# Patient Record
Sex: FEMALE | Race: WHITE | Employment: FULL TIME | URBAN - METROPOLITAN AREA
[De-identification: names, ages, dates, MRNs, and addresses within clinical notes are randomized per-mention and may not be internally consistent; named-entity substitution may affect disease eponyms.]

---

## 2019-02-09 ENCOUNTER — HOSPITAL ENCOUNTER (EMERGENCY)
Age: 34
Discharge: HOME | End: 2019-02-09
Payer: COMMERCIAL

## 2019-02-09 VITALS
HEIGHT: 68 IN | RESPIRATION RATE: 16 BRPM | TEMPERATURE: 98.4 F | OXYGEN SATURATION: 100 % | BODY MASS INDEX: 28.04 KG/M2 | WEIGHT: 185 LBS | SYSTOLIC BLOOD PRESSURE: 127 MMHG | DIASTOLIC BLOOD PRESSURE: 61 MMHG | HEART RATE: 69 BPM
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
## 2019-02-09 DIAGNOSIS — S46.812A STRAIN OF LEFT TRAPEZIUS MUSCLE, INITIAL ENCOUNTER: ICD-10-CM

## 2019-02-09 PROCEDURE — 99282 EMERGENCY DEPT VISIT SF MDM: CPT

## 2019-02-09 RX ORDER — METHOCARBAMOL 500 MG/1
500 TABLET, FILM COATED ORAL 3 TIMES DAILY
Qty: 30 TABLET | Refills: 0 | Status: SHIPPED | OUTPATIENT
Start: 2019-02-09 | End: 2019-04-04 | Stop reason: ALTCHOICE

## 2019-02-09 ASSESSMENT — PAIN DESCRIPTION - ORIENTATION: ORIENTATION: LEFT

## 2019-02-09 ASSESSMENT — PAIN SCALES - WONG BAKER: WONGBAKER_NUMERICALRESPONSE: 2

## 2019-02-09 ASSESSMENT — PAIN DESCRIPTION - LOCATION: LOCATION: NECK;SHOULDER

## 2019-02-09 ASSESSMENT — PAIN DESCRIPTION - DESCRIPTORS: DESCRIPTORS: ACHING

## 2019-02-09 ASSESSMENT — PAIN SCALES - GENERAL: PAINLEVEL_OUTOF10: 4

## 2019-04-04 ENCOUNTER — HOSPITAL ENCOUNTER (EMERGENCY)
Age: 34
Discharge: HOME | End: 2019-04-04
Attending: EMERGENCY MEDICINE
Payer: COMMERCIAL

## 2019-04-04 ENCOUNTER — APPOINTMENT (OUTPATIENT)
Dept: CT IMAGING | Age: 34
End: 2019-04-04
Payer: COMMERCIAL

## 2019-04-04 VITALS
HEIGHT: 67 IN | TEMPERATURE: 98.3 F | RESPIRATION RATE: 18 BRPM | HEART RATE: 82 BPM | BODY MASS INDEX: 29.03 KG/M2 | OXYGEN SATURATION: 97 % | WEIGHT: 185 LBS | SYSTOLIC BLOOD PRESSURE: 119 MMHG | DIASTOLIC BLOOD PRESSURE: 67 MMHG

## 2019-04-04 DIAGNOSIS — N73.0 PID (ACUTE PELVIC INFLAMMATORY DISEASE): Primary | ICD-10-CM

## 2019-04-04 LAB
ALBUMIN SERPL-MCNC: 4.4 G/DL (ref 3.5–5.2)
ALP BLD-CCNC: 56 U/L (ref 35–104)
ALT SERPL-CCNC: 10 U/L (ref 0–32)
ANION GAP SERPL CALCULATED.3IONS-SCNC: 7 MMOL/L (ref 7–16)
AST SERPL-CCNC: 11 U/L (ref 0–31)
BASOPHILS ABSOLUTE: 0.07 E9/L (ref 0–0.2)
BASOPHILS RELATIVE PERCENT: 0.7 % (ref 0–2)
BILIRUB SERPL-MCNC: 0.3 MG/DL (ref 0–1.2)
BILIRUBIN URINE: NEGATIVE
BLOOD, URINE: NEGATIVE
BUN BLDV-MCNC: 8 MG/DL (ref 6–20)
CALCIUM SERPL-MCNC: 9.3 MG/DL (ref 8.6–10.2)
CHLORIDE BLD-SCNC: 102 MMOL/L (ref 98–107)
CLARITY: CLEAR
CO2: 30 MMOL/L (ref 22–29)
COLOR: NORMAL
CREAT SERPL-MCNC: 0.7 MG/DL (ref 0.5–1)
EOSINOPHILS ABSOLUTE: 0.09 E9/L (ref 0.05–0.5)
EOSINOPHILS RELATIVE PERCENT: 0.9 % (ref 0–6)
GFR AFRICAN AMERICAN: >60
GFR NON-AFRICAN AMERICAN: >60 ML/MIN/1.73
GLUCOSE BLD-MCNC: 86 MG/DL (ref 74–99)
GLUCOSE URINE: NEGATIVE MG/DL
HCG, URINE, POC: NEGATIVE
HCT VFR BLD CALC: 35.4 % (ref 34–48)
HEMOGLOBIN: 10.9 G/DL (ref 11.5–15.5)
IMMATURE GRANULOCYTES #: 0.03 E9/L
IMMATURE GRANULOCYTES %: 0.3 % (ref 0–5)
KETONES, URINE: NEGATIVE MG/DL
LACTIC ACID: 0.5 MMOL/L (ref 0.5–2.2)
LEUKOCYTE ESTERASE, URINE: NEGATIVE
LIPASE: 24 U/L (ref 13–60)
LYMPHOCYTES ABSOLUTE: 3.1 E9/L (ref 1.5–4)
LYMPHOCYTES RELATIVE PERCENT: 30.6 % (ref 20–42)
Lab: NORMAL
MCH RBC QN AUTO: 27.1 PG (ref 26–35)
MCHC RBC AUTO-ENTMCNC: 30.8 % (ref 32–34.5)
MCV RBC AUTO: 88.1 FL (ref 80–99.9)
MONOCYTES ABSOLUTE: 0.74 E9/L (ref 0.1–0.95)
MONOCYTES RELATIVE PERCENT: 7.3 % (ref 2–12)
NEGATIVE QC PASS/FAIL: NORMAL
NEUTROPHILS ABSOLUTE: 6.1 E9/L (ref 1.8–7.3)
NEUTROPHILS RELATIVE PERCENT: 60.2 % (ref 43–80)
NITRITE, URINE: NEGATIVE
PDW BLD-RTO: 13.5 FL (ref 11.5–15)
PH UA: 7 (ref 5–9)
PLATELET # BLD: 295 E9/L (ref 130–450)
PMV BLD AUTO: 11 FL (ref 7–12)
POSITIVE QC PASS/FAIL: NORMAL
POTASSIUM SERPL-SCNC: 3.8 MMOL/L (ref 3.5–5)
PROTEIN UA: NEGATIVE MG/DL
RBC # BLD: 4.02 E12/L (ref 3.5–5.5)
SODIUM BLD-SCNC: 139 MMOL/L (ref 132–146)
SPECIFIC GRAVITY UA: <=1.005 (ref 1–1.03)
TOTAL PROTEIN: 6.6 G/DL (ref 6.4–8.3)
UROBILINOGEN, URINE: 0.2 E.U./DL
WBC # BLD: 10.1 E9/L (ref 4.5–11.5)

## 2019-04-04 PROCEDURE — 6360000004 HC RX CONTRAST MEDICATION: Performed by: RADIOLOGY

## 2019-04-04 PROCEDURE — 2580000003 HC RX 258: Performed by: EMERGENCY MEDICINE

## 2019-04-04 PROCEDURE — 87591 N.GONORRHOEAE DNA AMP PROB: CPT

## 2019-04-04 PROCEDURE — 87491 CHLMYD TRACH DNA AMP PROBE: CPT

## 2019-04-04 PROCEDURE — 6360000002 HC RX W HCPCS: Performed by: EMERGENCY MEDICINE

## 2019-04-04 PROCEDURE — 2500000003 HC RX 250 WO HCPCS: Performed by: EMERGENCY MEDICINE

## 2019-04-04 PROCEDURE — 83690 ASSAY OF LIPASE: CPT

## 2019-04-04 PROCEDURE — 96365 THER/PROPH/DIAG IV INF INIT: CPT

## 2019-04-04 PROCEDURE — 80053 COMPREHEN METABOLIC PANEL: CPT

## 2019-04-04 PROCEDURE — 96372 THER/PROPH/DIAG INJ SC/IM: CPT

## 2019-04-04 PROCEDURE — 83605 ASSAY OF LACTIC ACID: CPT

## 2019-04-04 PROCEDURE — 96375 TX/PRO/DX INJ NEW DRUG ADDON: CPT

## 2019-04-04 PROCEDURE — 96366 THER/PROPH/DIAG IV INF ADDON: CPT

## 2019-04-04 PROCEDURE — 99284 EMERGENCY DEPT VISIT MOD MDM: CPT

## 2019-04-04 PROCEDURE — 74177 CT ABD & PELVIS W/CONTRAST: CPT

## 2019-04-04 PROCEDURE — 81003 URINALYSIS AUTO W/O SCOPE: CPT

## 2019-04-04 PROCEDURE — 85025 COMPLETE CBC W/AUTO DIFF WBC: CPT

## 2019-04-04 PROCEDURE — 6370000000 HC RX 637 (ALT 250 FOR IP): Performed by: EMERGENCY MEDICINE

## 2019-04-04 RX ORDER — DOXYCYCLINE HYCLATE 100 MG/1
100 CAPSULE ORAL ONCE
Status: COMPLETED | OUTPATIENT
Start: 2019-04-04 | End: 2019-04-04

## 2019-04-04 RX ORDER — DOXYCYCLINE HYCLATE 100 MG
100 TABLET ORAL 2 TIMES DAILY
Qty: 28 TABLET | Refills: 0 | Status: SHIPPED | OUTPATIENT
Start: 2019-04-04 | End: 2019-04-18

## 2019-04-04 RX ORDER — 0.9 % SODIUM CHLORIDE 0.9 %
1000 INTRAVENOUS SOLUTION INTRAVENOUS ONCE
Status: COMPLETED | OUTPATIENT
Start: 2019-04-04 | End: 2019-04-04

## 2019-04-04 RX ORDER — HYDROCODONE BITARTRATE AND ACETAMINOPHEN 5; 325 MG/1; MG/1
1 TABLET ORAL EVERY 6 HOURS PRN
Qty: 10 TABLET | Refills: 0 | Status: SHIPPED | OUTPATIENT
Start: 2019-04-04 | End: 2019-04-07

## 2019-04-04 RX ORDER — ONDANSETRON 4 MG/1
4 TABLET, ORALLY DISINTEGRATING ORAL EVERY 8 HOURS PRN
Qty: 10 TABLET | Refills: 0 | Status: SHIPPED | OUTPATIENT
Start: 2019-04-04 | End: 2019-07-01

## 2019-04-04 RX ORDER — MORPHINE SULFATE 2 MG/ML
4 INJECTION, SOLUTION INTRAMUSCULAR; INTRAVENOUS ONCE
Status: COMPLETED | OUTPATIENT
Start: 2019-04-04 | End: 2019-04-04

## 2019-04-04 RX ORDER — ONDANSETRON 2 MG/ML
4 INJECTION INTRAMUSCULAR; INTRAVENOUS ONCE
Status: COMPLETED | OUTPATIENT
Start: 2019-04-04 | End: 2019-04-04

## 2019-04-04 RX ORDER — HYDROCODONE BITARTRATE AND ACETAMINOPHEN 5; 325 MG/1; MG/1
1 TABLET ORAL ONCE
Status: COMPLETED | OUTPATIENT
Start: 2019-04-04 | End: 2019-04-04

## 2019-04-04 RX ORDER — FENTANYL CITRATE 50 UG/ML
50 INJECTION, SOLUTION INTRAMUSCULAR; INTRAVENOUS ONCE
Status: COMPLETED | OUTPATIENT
Start: 2019-04-04 | End: 2019-04-04

## 2019-04-04 RX ADMIN — PIPERACILLIN SODIUM AND TAZOBACTAM SODIUM 3.38 G: 3; .375 INJECTION, POWDER, LYOPHILIZED, FOR SOLUTION INTRAVENOUS at 14:27

## 2019-04-04 RX ADMIN — FENTANYL CITRATE 50 MCG: 50 INJECTION INTRAMUSCULAR; INTRAVENOUS at 14:25

## 2019-04-04 RX ADMIN — DOXYCYCLINE HYCLATE 100 MG: 100 CAPSULE ORAL at 19:24

## 2019-04-04 RX ADMIN — SODIUM CHLORIDE 1000 ML: 9 INJECTION, SOLUTION INTRAVENOUS at 12:04

## 2019-04-04 RX ADMIN — HYDROCODONE BITARTRATE AND ACETAMINOPHEN 1 TABLET: 5; 325 TABLET ORAL at 19:24

## 2019-04-04 RX ADMIN — MORPHINE SULFATE 4 MG: 2 INJECTION, SOLUTION INTRAMUSCULAR; INTRAVENOUS at 12:04

## 2019-04-04 RX ADMIN — IOPAMIDOL 110 ML: 755 INJECTION, SOLUTION INTRAVENOUS at 13:11

## 2019-04-04 RX ADMIN — ONDANSETRON 4 MG: 2 INJECTION INTRAMUSCULAR; INTRAVENOUS at 12:05

## 2019-04-04 RX ADMIN — CEFTRIAXONE SODIUM 250 MG: 250 INJECTION, POWDER, FOR SOLUTION INTRAMUSCULAR; INTRAVENOUS at 19:23

## 2019-04-04 ASSESSMENT — PAIN SCALES - GENERAL
PAINLEVEL_OUTOF10: 7
PAINLEVEL_OUTOF10: 6
PAINLEVEL_OUTOF10: 6
PAINLEVEL_OUTOF10: 7

## 2019-04-04 ASSESSMENT — PAIN DESCRIPTION - DESCRIPTORS
DESCRIPTORS: SHOOTING;SHARP
DESCRIPTORS: ACHING;DULL

## 2019-04-04 ASSESSMENT — PAIN DESCRIPTION - FREQUENCY
FREQUENCY: INTERMITTENT
FREQUENCY: INTERMITTENT

## 2019-04-04 ASSESSMENT — PAIN SCALES - WONG BAKER: WONGBAKER_NUMERICALRESPONSE: 0

## 2019-04-04 ASSESSMENT — PAIN DESCRIPTION - LOCATION: LOCATION: ABDOMEN

## 2019-04-04 ASSESSMENT — PAIN DESCRIPTION - ORIENTATION: ORIENTATION: RIGHT

## 2019-04-04 ASSESSMENT — PAIN DESCRIPTION - PAIN TYPE
TYPE: ACUTE PAIN
TYPE: ACUTE PAIN

## 2019-04-04 NOTE — CONSULTS
The liver is unremarkable The spleen is unremarkable. The kidneys are unremarkable The adrenals is  unremarkable. The pancreas is unremarkable. Vermiform appendix is not well-defined and is silhouetted by fluid. Right ovary is also not well-defined and contains a tiny cystic structure where a 2 cm cystic present previously. Some fluid extends upwards to the subhepatic regional along the right flank and also downward along the paracolic gutter into the pelvis with a good portion subtly posteriorly. Fluid is slightly complex. The bladder is unremarkable. T-shaped intrauterine device again remains satisfactorily positioned. Uterus remains anteverted. Left ovary is unremarkable. Findings are nonspecific. Acute appendicitis with rupture must be considered. Recent rupture of a hemorrhagic ovarian cyst was additionally be considered. Pelvic inflammatory disease is another consideration. Clinical and laboratory correlation suggested. Results discussed with the ordering ER doctor today.          ASSESSMENT:  29 y.o. female with abdominal pain possibly secondary to ruptured ovarian cyst    PLAN:  No acute surgical intervention  Recommend admission to medicine with OB/Gyn consult  Chlamydia and Gonnorrhea test pending  NPO  IVF's  Pain and nausea control     D/w Dr. Swenson March    Electronically signed by Lora Blunt MD on 4/4/19 at 6:03 PM

## 2019-04-04 NOTE — ED PROVIDER NOTES
Alert and oriented x3  Head: Normocephalic and atraumatic  Eyes: PERRL, EOMI  Mouth: Oropharynx clear, handling secretions  Neck: Supple, full ROM  Respiratory: Lungs clear to auscultation bilaterally, no wheezes, rales, or rhonchi. Not in respiratory distress  Cardiovascular:  Regular rate. Regular rhythm. No murmurs, gallops, or rubs. 2+ distal pulses  Chest: No chest wall tenderness  GI:  Abdomen Soft, RLQ tenderness, Non distended. +BS. No rebound, guarding, or rigidity. No pulsatile masses. Musculoskeletal: Moves all extremities x 4. Warm and well perfused, no edema. Integument: skin warm and dry. No rashes. Neurologic: GCS 15, no focal deficits  Psychiatric: Normal Affect      -------------------------------------------------- RESULTS -------------------------------------------------  I have personally reviewed all laboratory and imaging results for this patient. Results are listed below. LABS:  Results for orders placed or performed during the hospital encounter of 04/04/19   C. Trachomatis / N. Gonorrhoeae, DNA Probe   Result Value Ref Range    C. Trachomatis Amplified       Negative for C. Trachomatis rRNA  Results should be interpreted in conjunction with other  laboratory and clinical data available to the clinician. N. Gonorrhoeae Amplified       Negative for N. gonorrhoeae rRNA  Results should be interpreted in conjunction with other  laboratory and clinical data available to the clinician.      CBC Auto Differential   Result Value Ref Range    WBC 10.1 4.5 - 11.5 E9/L    RBC 4.02 3.50 - 5.50 E12/L    Hemoglobin 10.9 (L) 11.5 - 15.5 g/dL    Hematocrit 35.4 34.0 - 48.0 %    MCV 88.1 80.0 - 99.9 fL    MCH 27.1 26.0 - 35.0 pg    MCHC 30.8 (L) 32.0 - 34.5 %    RDW 13.5 11.5 - 15.0 fL    Platelets 970 949 - 490 E9/L    MPV 11.0 7.0 - 12.0 fL    Neutrophils % 60.2 43.0 - 80.0 %    Immature Granulocytes % 0.3 0.0 - 5.0 %    Lymphocytes % 30.6 20.0 - 42.0 %    Monocytes % 7.3 2.0 - 12.0 % Eosinophils % 0.9 0.0 - 6.0 %    Basophils % 0.7 0.0 - 2.0 %    Neutrophils # 6.10 1.80 - 7.30 E9/L    Immature Granulocytes # 0.03 E9/L    Lymphocytes # 3.10 1.50 - 4.00 E9/L    Monocytes # 0.74 0.10 - 0.95 E9/L    Eosinophils # 0.09 0.05 - 0.50 E9/L    Basophils # 0.07 0.00 - 0.20 E9/L   Comprehensive Metabolic Panel   Result Value Ref Range    Sodium 139 132 - 146 mmol/L    Potassium 3.8 3.5 - 5.0 mmol/L    Chloride 102 98 - 107 mmol/L    CO2 30 (H) 22 - 29 mmol/L    Anion Gap 7 7 - 16 mmol/L    Glucose 86 74 - 99 mg/dL    BUN 8 6 - 20 mg/dL    CREATININE 0.7 0.5 - 1.0 mg/dL    GFR Non-African American >60 >=60 mL/min/1.73    GFR African American >60     Calcium 9.3 8.6 - 10.2 mg/dL    Total Protein 6.6 6.4 - 8.3 g/dL    Alb 4.4 3.5 - 5.2 g/dL    Total Bilirubin 0.3 0.0 - 1.2 mg/dL    Alkaline Phosphatase 56 35 - 104 U/L    ALT 10 0 - 32 U/L    AST 11 0 - 31 U/L   Lactic Acid, Plasma   Result Value Ref Range    Lactic Acid 0.5 0.5 - 2.2 mmol/L   Lipase   Result Value Ref Range    Lipase 24 13 - 60 U/L   Urinalysis   Result Value Ref Range    Color, UA Straw Straw/Yellow    Clarity, UA Clear Clear    Glucose, Ur Negative Negative mg/dL    Bilirubin Urine Negative Negative    Ketones, Urine Negative Negative mg/dL    Specific Gravity, UA <=1.005 1.005 - 1.030    Blood, Urine Negative Negative    pH, UA 7.0 5.0 - 9.0    Protein, UA Negative Negative mg/dL    Urobilinogen, Urine 0.2 <2.0 E.U./dL    Nitrite, Urine Negative Negative    Leukocyte Esterase, Urine Negative Negative   POC Pregnancy Urine   Result Value Ref Range    HCG, Urine, POC Negative Negative    Lot Number [id number]     Positive QC Pass/Fail Pass     Negative QC Pass/Fail Pass        RADIOLOGY:  Interpreted by Radiologist.  CT ABDOMEN PELVIS W IV CONTRAST Additional Contrast? None   Final Result   Findings are nonspecific. Acute appendicitis with rupture   must be considered.  Recent rupture of a hemorrhagic ovarian cyst was   additionally be considered. Pelvic inflammatory disease is another   consideration. Clinical and laboratory correlation suggested. Results   discussed with the ordering ER doctor today.                                        ------------------------- NURSING NOTES AND VITALS REVIEWED ---------------------------   The nursing notes within the ED encounter and vital signs as below have been reviewed by myself. /67   Pulse 82   Temp 98.3 °F (36.8 °C) (Oral)   Resp 18   Ht 5' 7\" (1.702 m)   Wt 185 lb (83.9 kg)   LMP 03/25/2019   SpO2 97%   BMI 28.98 kg/m²   Oxygen Saturation Interpretation: Normal    The patients available past medical records and past encounters were reviewed. ------------------------------ ED COURSE/MEDICAL DECISION MAKING----------------------  Medications   0.9 % sodium chloride bolus (0 mLs Intravenous Stopped 4/4/19 1404)   morphine (PF) injection 4 mg (4 mg Intravenous Given 4/4/19 1204)   ondansetron (ZOFRAN) injection 4 mg (4 mg Intravenous Given 4/4/19 1205)   iopamidol (ISOVUE-370) 76 % injection 110 mL (110 mLs Intravenous Given 4/4/19 1311)   piperacillin-tazobactam (ZOSYN) 3.375 g in dextrose 5 % 50 mL IVPB (mini-bag) (0 g Intravenous Stopped 4/4/19 1724)   fentaNYL (SUBLIMAZE) injection 50 mcg (50 mcg Intravenous Given 4/4/19 1425)   cefTRIAXone (ROCEPHIN) 250 mg in lidocaine 1 % 1 mL IM Injection (250 mg Intramuscular Given 4/4/19 1923)   doxycycline hyclate (VIBRAMYCIN) capsule 100 mg (100 mg Oral Given 4/4/19 1924)   HYDROcodone-acetaminophen (NORCO) 5-325 MG per tablet 1 tablet (1 tablet Oral Given 4/4/19 1924)       Medical Decision Making:    Patient comes to the ED with abdominal pain and pain to her shoulder and neck. Pain began during intercourse. Thinks that her IUD may have became dislodged. Had IV fluid with medication, CT abdomen, and lab work ordered.  Scan considers acute appendicitis with rupture or recent rupture of hemorrhagic ovarian cyst. Surgery not suspicious for appendicitis. After pelvic exam performed by PA showing IUD string in place and cervical motion tenderness. Patient will be treated as PID and followup as outpatient. Discussed importance of outpatient followup and signs and symptoms for which to return. This patient's ED course included: a personal history and physicial examination, re-evaluation prior to disposition and IV medications    This patient has remained hemodynamically stable during their ED course. Re-Evaluations:           Discussed results. Upon re-examination, patients symptoms show no change. Consultations:             Time: 3:59 PM.   Spoke with Dr. Akil Bautista (Surgery). Discussed case. They will provide consultation. Counseling: The emergency provider has spoken with the patient and discussed todays results, in addition to providing specific details for the plan of care and counseling regarding the diagnosis and prognosis. Questions are answered at this time and they are agreeable with the plan.       --------------------------------- IMPRESSION AND DISPOSITION ---------------------------------    IMPRESSION  1. PID (acute pelvic inflammatory disease)        DISPOSITION  Disposition: Discharge to home  Patient condition is stable    SCRIBE ATTESTATION  4/4/19, 11:57 AM.    This note is prepared by Jaxon acting as Scribe for Veronique Saunders MD.    Veronique Saunders MD:  The scribe's documentation has been prepared under my direction and personally reviewed by me in its entirety. I confirm that the note above accurately reflects all work, treatment, procedures, and medical decision making performed by me.              Veronique Saunders MD  04/27/19 2020

## 2019-04-04 NOTE — LETTER
[facility] Emergency Department  [address] [place] [id number]  Phone: [phone]               April 4, 2019    Patient: Lesa Castillo   YOB: 1985   Date of Visit: 4/4/2019       To Whom It May Concern:    Lesa Castillo was seen and treated in our emergency department on 4/4/2019. She may return to work on Tuesday 4/9/2019 without any restrictions.       Sincerely,       Esvin Muñoz RN         Signature:__________________________________

## 2019-04-09 LAB
CHLAMYDIA TRACHOMATIS AMPLIFIED DET: NORMAL
N GONORRHOEAE AMPLIFIED DET: NORMAL

## 2019-04-26 ENCOUNTER — TELEPHONE (OUTPATIENT)
Dept: OBGYN | Age: 34
End: 2019-04-26

## 2019-06-17 ENCOUNTER — TELEPHONE (OUTPATIENT)
Dept: OBGYN | Age: 34
End: 2019-06-17

## 2019-06-17 NOTE — TELEPHONE ENCOUNTER
Unable to call patient regarding missed appointment d/t no phone number listed in chart. Card mailed today for patient to call and reschedule.       -Kaela, 6/17/19

## 2019-07-01 ENCOUNTER — OFFICE VISIT (OUTPATIENT)
Dept: PRIMARY CARE CLINIC | Age: 34
End: 2019-07-01
Payer: COMMERCIAL

## 2019-07-01 VITALS
BODY MASS INDEX: 28 KG/M2 | HEIGHT: 67 IN | DIASTOLIC BLOOD PRESSURE: 70 MMHG | TEMPERATURE: 98.5 F | RESPIRATION RATE: 18 BRPM | OXYGEN SATURATION: 97 % | WEIGHT: 178.4 LBS | SYSTOLIC BLOOD PRESSURE: 104 MMHG | HEART RATE: 78 BPM

## 2019-07-01 DIAGNOSIS — R10.31 RIGHT LOWER QUADRANT PAIN: ICD-10-CM

## 2019-07-01 DIAGNOSIS — F41.9 ANXIETY: ICD-10-CM

## 2019-07-01 DIAGNOSIS — R53.83 FATIGUE, UNSPECIFIED TYPE: ICD-10-CM

## 2019-07-01 DIAGNOSIS — F32.A DEPRESSION, UNSPECIFIED DEPRESSION TYPE: ICD-10-CM

## 2019-07-01 DIAGNOSIS — F98.8 ATTENTION DEFICIT DISORDER (ADD) WITHOUT HYPERACTIVITY: ICD-10-CM

## 2019-07-01 DIAGNOSIS — N83.209 CYST OF OVARY, UNSPECIFIED LATERALITY: ICD-10-CM

## 2019-07-01 DIAGNOSIS — D64.9 ANEMIA, UNSPECIFIED TYPE: Primary | ICD-10-CM

## 2019-07-01 PROCEDURE — 99203 OFFICE O/P NEW LOW 30 MIN: CPT | Performed by: FAMILY MEDICINE

## 2019-07-01 RX ORDER — LAMOTRIGINE 150 MG/1
TABLET ORAL
Refills: 1 | COMMUNITY
Start: 2019-05-28

## 2019-07-01 ASSESSMENT — PATIENT HEALTH QUESTIONNAIRE - PHQ9
1. LITTLE INTEREST OR PLEASURE IN DOING THINGS: 1
SUM OF ALL RESPONSES TO PHQ QUESTIONS 1-9: 1
2. FEELING DOWN, DEPRESSED OR HOPELESS: 0
SUM OF ALL RESPONSES TO PHQ QUESTIONS 1-9: 1
SUM OF ALL RESPONSES TO PHQ9 QUESTIONS 1 & 2: 1

## 2019-07-01 ASSESSMENT — ENCOUNTER SYMPTOMS
BACK PAIN: 0
TROUBLE SWALLOWING: 0
CONSTIPATION: 1
EYES NEGATIVE: 1
VOMITING: 0
ABDOMINAL PAIN: 1
NAUSEA: 0
SINUS PRESSURE: 0
DIARRHEA: 0
COUGH: 0
RHINORRHEA: 0
SHORTNESS OF BREATH: 0

## 2019-07-01 NOTE — PROGRESS NOTES
Subjective:  29 y.o. y/o female presents to establish care. Previous PCP:  Last saw PCP 5 years ago, Dr. Shayan Rock, discharged from office (unsure why)    Psych: [facility], transferring to [facility] for therapy and psychiatry, lamictal for 6 months, celexa and buspar previously, ADD, anxiety and depression, no refills needed  Seen in ED few months ago for severe RLQ pain, PID vs ruptured ovarian cyst, finished abx, pain less off/on  +pain after intercourse, paraguard about 8 years  Seen by Dr. Otoniel Torres previously, insurance issues and didn't reschedule lately  Mild anemia on ED lab    PMH, [facility], and FH were reviewed and otherwise documented in chart. Review of Systems   Constitutional: Positive for activity change and fatigue. Negative for appetite change, chills, diaphoresis, fever and unexpected weight change. HENT: Negative for congestion, dental problem, ear pain, hearing loss, rhinorrhea, sinus pressure, tinnitus and trouble swallowing. Eyes: Negative. Respiratory: Negative for cough and shortness of breath. Cardiovascular: Negative for chest pain, palpitations and leg swelling. Gastrointestinal: Positive for abdominal pain and constipation (not taking anything). Negative for diarrhea, nausea and vomiting. Endocrine: Negative for cold intolerance, heat intolerance, polydipsia and polyphagia. Genitourinary: Positive for dyspareunia and menstrual problem (irregular periods since ED visit). Negative for difficulty urinating, dysuria, hematuria, vaginal discharge and vaginal pain. Musculoskeletal: Positive for arthralgias. Negative for back pain, joint swelling and neck pain. Knee caps feel achy   Skin: Negative. Negative for rash. Allergic/Immunologic: Positive for food allergies (shrimp, uncontrolled vomiting). Negative for environmental allergies and immunocompromised state. Neurological: Positive for light-headedness and headaches.  Negative for dizziness, tremors,

## 2019-07-02 ENCOUNTER — HOSPITAL ENCOUNTER (OUTPATIENT)
Age: 34
Discharge: HOME | End: 2019-07-04
Payer: COMMERCIAL

## 2019-07-02 DIAGNOSIS — D64.9 ANEMIA, UNSPECIFIED TYPE: ICD-10-CM

## 2019-07-02 DIAGNOSIS — R53.83 FATIGUE, UNSPECIFIED TYPE: ICD-10-CM

## 2019-07-02 LAB
ALBUMIN SERPL-MCNC: 4.3 G/DL (ref 3.5–5.2)
ALP BLD-CCNC: 48 U/L (ref 35–104)
ALT SERPL-CCNC: 9 U/L (ref 0–32)
ANION GAP SERPL CALCULATED.3IONS-SCNC: 11 MMOL/L (ref 7–16)
AST SERPL-CCNC: 11 U/L (ref 0–31)
BASOPHILS ABSOLUTE: 0.05 E9/L (ref 0–0.2)
BASOPHILS RELATIVE PERCENT: 1 % (ref 0–2)
BILIRUB SERPL-MCNC: 0.3 MG/DL (ref 0–1.2)
BUN BLDV-MCNC: 12 MG/DL (ref 6–20)
CALCIUM SERPL-MCNC: 9.4 MG/DL (ref 8.6–10.2)
CHLORIDE BLD-SCNC: 107 MMOL/L (ref 98–107)
CO2: 27 MMOL/L (ref 22–29)
CREAT SERPL-MCNC: 0.9 MG/DL (ref 0.5–1)
EOSINOPHILS ABSOLUTE: 0.14 E9/L (ref 0.05–0.5)
EOSINOPHILS RELATIVE PERCENT: 2.8 % (ref 0–6)
GFR AFRICAN AMERICAN: >60
GFR NON-AFRICAN AMERICAN: >60 ML/MIN/1.73
GLUCOSE BLD-MCNC: 89 MG/DL (ref 74–99)
HCT VFR BLD CALC: 38.1 % (ref 34–48)
HEMOGLOBIN: 11.8 G/DL (ref 11.5–15.5)
IMMATURE GRANULOCYTES #: 0.01 E9/L
IMMATURE GRANULOCYTES %: 0.2 % (ref 0–5)
LYMPHOCYTES ABSOLUTE: 2.38 E9/L (ref 1.5–4)
LYMPHOCYTES RELATIVE PERCENT: 46.8 % (ref 20–42)
MCH RBC QN AUTO: 27.6 PG (ref 26–35)
MCHC RBC AUTO-ENTMCNC: 31 % (ref 32–34.5)
MCV RBC AUTO: 89 FL (ref 80–99.9)
MONOCYTES ABSOLUTE: 0.45 E9/L (ref 0.1–0.95)
MONOCYTES RELATIVE PERCENT: 8.8 % (ref 2–12)
NEUTROPHILS ABSOLUTE: 2.06 E9/L (ref 1.8–7.3)
NEUTROPHILS RELATIVE PERCENT: 40.4 % (ref 43–80)
PDW BLD-RTO: 13.4 FL (ref 11.5–15)
PLATELET # BLD: 300 E9/L (ref 130–450)
PMV BLD AUTO: 11.8 FL (ref 7–12)
POTASSIUM SERPL-SCNC: 4.1 MMOL/L (ref 3.5–5)
RBC # BLD: 4.28 E12/L (ref 3.5–5.5)
SODIUM BLD-SCNC: 145 MMOL/L (ref 132–146)
TOTAL PROTEIN: 6.5 G/DL (ref 6.4–8.3)
TSH SERPL DL<=0.05 MIU/L-ACNC: 1.17 UIU/ML (ref 0.27–4.2)
WBC # BLD: 5.1 E9/L (ref 4.5–11.5)

## 2019-07-02 PROCEDURE — 84443 ASSAY THYROID STIM HORMONE: CPT

## 2019-07-02 PROCEDURE — 85025 COMPLETE CBC W/AUTO DIFF WBC: CPT

## 2019-07-02 PROCEDURE — 80053 COMPREHEN METABOLIC PANEL: CPT

## 2019-09-09 ENCOUNTER — TELEPHONE (OUTPATIENT)
Dept: PRIMARY CARE CLINIC | Age: 34
End: 2019-09-09

## 2023-10-25 ENCOUNTER — HOSPITAL ENCOUNTER (EMERGENCY)
Age: 38
Discharge: HOME | End: 2023-10-25
Payer: COMMERCIAL

## 2023-10-25 VITALS
TEMPERATURE: 98.6 F | HEIGHT: 67 IN | DIASTOLIC BLOOD PRESSURE: 70 MMHG | RESPIRATION RATE: 16 BRPM | WEIGHT: 170 LBS | SYSTOLIC BLOOD PRESSURE: 125 MMHG | BODY MASS INDEX: 26.68 KG/M2 | HEART RATE: 98 BPM | OXYGEN SATURATION: 99 %

## 2023-10-25 DIAGNOSIS — N75.0 BARTHOLIN GLAND CYST: Primary | ICD-10-CM

## 2023-10-25 PROCEDURE — 99283 EMERGENCY DEPT VISIT LOW MDM: CPT

## 2023-10-25 RX ORDER — DOCUSATE SODIUM 100 MG/1
100 CAPSULE, LIQUID FILLED ORAL NIGHTLY
COMMUNITY

## 2023-10-25 RX ORDER — LISDEXAMFETAMINE DIMESYLATE CAPSULES 20 MG/1
20 CAPSULE ORAL DAILY
COMMUNITY

## 2023-10-25 RX ORDER — SULFAMETHOXAZOLE AND TRIMETHOPRIM 800; 160 MG/1; MG/1
1 TABLET ORAL 2 TIMES DAILY
Qty: 20 TABLET | Refills: 0 | Status: SHIPPED | OUTPATIENT
Start: 2023-10-25 | End: 2023-11-04

## 2023-10-25 RX ORDER — NAPROXEN 500 MG/1
500 TABLET ORAL 2 TIMES DAILY
Qty: 14 TABLET | Refills: 0 | Status: SHIPPED | OUTPATIENT
Start: 2023-10-25 | End: 2023-11-01

## 2023-10-25 RX ORDER — LAMOTRIGINE 200 MG/1
200 TABLET ORAL 2 TIMES DAILY
COMMUNITY

## 2023-10-25 ASSESSMENT — PAIN DESCRIPTION - DESCRIPTORS: DESCRIPTORS: DISCOMFORT;PRESSURE

## 2023-10-25 ASSESSMENT — PAIN DESCRIPTION - ORIENTATION: ORIENTATION: LEFT

## 2023-10-25 ASSESSMENT — PAIN SCALES - GENERAL: PAINLEVEL_OUTOF10: 4

## 2023-10-25 ASSESSMENT — PAIN DESCRIPTION - LOCATION: LOCATION: VULVA

## 2023-10-25 ASSESSMENT — PAIN DESCRIPTION - PAIN TYPE: TYPE: ACUTE PAIN

## 2023-10-25 ASSESSMENT — PAIN - FUNCTIONAL ASSESSMENT: PAIN_FUNCTIONAL_ASSESSMENT: 0-10

## 2023-10-25 NOTE — ED PROVIDER NOTES
Independent [facility] Visit. [address]  Department of Emergency Medicine   ED  Encounter Note  Admit Date/RoomTime: 10/25/2023 10:20 AM  ED Room:   NAME: Filemon Arias  : 1985  MRN: [id number]     Chief Complaint:  Vaginal Pain (Pt with left side swelling at outside of vagina noticed this morning)    HISTORY OF PRESENT ILLNESS        Filemon Arias is a 40 y.o. female who presents to the ED with a complaint of vaginal pain. Patient states for about 2 months now she has noted a swollen lump to the left side of her vagina. Progressively getting larger. States it just feels like pressure on that left side of the vagina. She is sexually active. 1 partner. She has no STD concerns. She denies any fevers or chills. Denies any difficulty urinating. Denies any dysuria. Denies any nausea or vomiting. She rates the discomfort a 4 out of 10. ROS   Pertinent positives and negatives are stated within HPI, all other systems reviewed and are negative. Past Medical History:  has a past medical history of Anemia, Anxiety, PTSD (post-traumatic stress disorder), and Seasonal affective disorder ([address]). Surgical History:  has no past surgical history on file. Social History:  reports that she quit smoking about 8 years ago. Her smoking use included cigarettes. She smoked an average of .5 packs per day. She has never used smokeless tobacco. She reports current alcohol use. She reports that she does not use drugs. Family History: family history includes Bipolar Disorder in her mother; Heart Failure in her paternal grandfather; Other in her father and maternal grandmother; Schizophrenia in her mother; Stomach Cancer in her paternal grandmother. Allergies: Ritalin [methylphenidate hcl], Adderall [amphetamine-dextroamphetamine], and Ambien [zolpidem]    PHYSICAL EXAM   Oxygen Saturation Interpretation: Normal on room air analysis.         ED Triage Vitals   BP Temp Temp Source Pulse

## 2023-11-08 ENCOUNTER — OFFICE VISIT (OUTPATIENT)
Dept: OBGYN | Age: 38
End: 2023-11-08
Payer: COMMERCIAL

## 2023-11-08 VITALS
WEIGHT: 174.6 LBS | HEART RATE: 83 BPM | SYSTOLIC BLOOD PRESSURE: 122 MMHG | DIASTOLIC BLOOD PRESSURE: 75 MMHG | BODY MASS INDEX: 27.35 KG/M2

## 2023-11-08 DIAGNOSIS — N75.0 BARTHOLIN CYST: Primary | ICD-10-CM

## 2023-11-08 PROCEDURE — 99203 OFFICE O/P NEW LOW 30 MIN: CPT | Performed by: OBSTETRICS & GYNECOLOGY

## 2023-11-08 PROCEDURE — G8419 CALC BMI OUT NRM PARAM NOF/U: HCPCS | Performed by: OBSTETRICS & GYNECOLOGY

## 2023-11-08 PROCEDURE — G8484 FLU IMMUNIZE NO ADMIN: HCPCS | Performed by: OBSTETRICS & GYNECOLOGY

## 2023-11-08 PROCEDURE — G8427 DOCREV CUR MEDS BY ELIG CLIN: HCPCS | Performed by: OBSTETRICS & GYNECOLOGY

## 2023-11-08 PROCEDURE — 1036F TOBACCO NON-USER: CPT | Performed by: OBSTETRICS & GYNECOLOGY

## 2023-11-08 SDOH — ECONOMIC STABILITY: FOOD INSECURITY: WITHIN THE PAST 12 MONTHS, YOU WORRIED THAT YOUR FOOD WOULD RUN OUT BEFORE YOU GOT MONEY TO BUY MORE.: NEVER TRUE

## 2023-11-08 SDOH — ECONOMIC STABILITY: HOUSING INSECURITY
IN THE LAST 12 MONTHS, WAS THERE A TIME WHEN YOU DID NOT HAVE A STEADY PLACE TO SLEEP OR SLEPT IN A SHELTER (INCLUDING NOW)?: NO

## 2023-11-08 SDOH — ECONOMIC STABILITY: FOOD INSECURITY: WITHIN THE PAST 12 MONTHS, THE FOOD YOU BOUGHT JUST DIDN'T LAST AND YOU DIDN'T HAVE MONEY TO GET MORE.: NEVER TRUE

## 2023-11-08 SDOH — ECONOMIC STABILITY: INCOME INSECURITY: HOW HARD IS IT FOR YOU TO PAY FOR THE VERY BASICS LIKE FOOD, HOUSING, MEDICAL CARE, AND HEATING?: SOMEWHAT HARD

## 2023-11-08 ASSESSMENT — COLUMBIA-SUICIDE SEVERITY RATING SCALE - C-SSRS
4. HAVE YOU HAD THESE THOUGHTS AND HAD SOME INTENTION OF ACTING ON THEM?: NO
5. HAVE YOU STARTED TO WORK OUT OR WORKED OUT THE DETAILS OF HOW TO KILL YOURSELF? DO YOU INTEND TO CARRY OUT THIS PLAN?: NO
7. DID THIS OCCUR IN THE LAST THREE MONTHS: NO
3. HAVE YOU BEEN THINKING ABOUT HOW YOU MIGHT KILL YOURSELF?: NO

## 2023-11-08 ASSESSMENT — PATIENT HEALTH QUESTIONNAIRE - PHQ9
SUM OF ALL RESPONSES TO PHQ9 QUESTIONS 1 & 2: 3
6. FEELING BAD ABOUT YOURSELF - OR THAT YOU ARE A FAILURE OR HAVE LET YOURSELF OR YOUR FAMILY DOWN: 1
9. THOUGHTS THAT YOU WOULD BE BETTER OFF DEAD, OR OF HURTING YOURSELF: 0
1. LITTLE INTEREST OR PLEASURE IN DOING THINGS: 2
3. TROUBLE FALLING OR STAYING ASLEEP: 3
SUM OF ALL RESPONSES TO PHQ QUESTIONS 1-9: 12
10. IF YOU CHECKED OFF ANY PROBLEMS, HOW DIFFICULT HAVE THESE PROBLEMS MADE IT FOR YOU TO DO YOUR WORK, TAKE CARE OF THINGS AT HOME, OR GET ALONG WITH OTHER PEOPLE: 2
SUM OF ALL RESPONSES TO PHQ QUESTIONS 1-9: 12
5. POOR APPETITE OR OVEREATING: 3
SUM OF ALL RESPONSES TO PHQ QUESTIONS 1-9: 12
4. FEELING TIRED OR HAVING LITTLE ENERGY: 2
8. MOVING OR SPEAKING SO SLOWLY THAT OTHER PEOPLE COULD HAVE NOTICED. OR THE OPPOSITE, BEING SO FIGETY OR RESTLESS THAT YOU HAVE BEEN MOVING AROUND A LOT MORE THAN USUAL: 0
2. FEELING DOWN, DEPRESSED OR HOPELESS: 1
7. TROUBLE CONCENTRATING ON THINGS, SUCH AS READING THE NEWSPAPER OR WATCHING TELEVISION: 0
SUM OF ALL RESPONSES TO PHQ QUESTIONS 1-9: 12

## 2023-11-08 NOTE — PROGRESS NOTES
Satinder Mckay is a 71-year-old G1, P3 female whose LMP was October 2023. She has the ParaGard IUD in place for 12 years. She bleeds approximately 8 days changing a pad 2-3 times a day with mild dysmenorrhea relieved with Excedrin. Last Pap over 8 years ago. Not currently sexually active. Reports she presented to the ER on 10-20 523 with a left Bartholin's cyst she was placed on 10 days of antibiotic does believe it has decreased in size. Patient presents for Bartholin cyst    Past Medical History:   Diagnosis Date    Anemia     Anxiety     PTSD (post-traumatic stress disorder)     Seasonal affective disorder ([address])         History reviewed. No pertinent surgical history. Family History   Problem Relation Age of Onset    Schizophrenia Mother     Bipolar Disorder Mother     Other Father         PTSD    Other Maternal Grandmother         Pancreatitis    Stomach Cancer Paternal Grandmother     Heart Failure Paternal Grandfather         Social History       Tobacco History       Smoking Status  Former Quit Date  2/4/2015 Smoking Frequency  0.50 packs/day Smoking Tobacco Type  Cigarettes quit in 2/4/2015      Smokeless Tobacco Use  Never              Alcohol History       Alcohol Use Status  Yes Comment  socially              Drug Use       Drug Use Status  No              Sexual Activity       Sexually Active  Yes Partners  Male                      Current Outpatient Medications:     lamoTRIgine (LAMICTAL) 200 MG tablet, Take 1 tablet by mouth 2 times daily, Disp: , Rfl:     Lisdexamfetamine Dimesylate (VYVANSE) 20 MG CAPS, Take 1 capsule by mouth daily. , Disp: , Rfl:     docusate sodium (COLACE) 100 MG capsule, Take 1 capsule by mouth nightly, Disp: , Rfl:     naproxen (NAPROSYN) 500 MG tablet, Take 1 tablet by mouth 2 times daily for 7 days, Disp: 14 tablet, Rfl: 0     Allergies   Allergen Reactions    Ritalin [Methylphenidate Hcl] Other (See Comments)     Cant walk or talk    Adderall

## 2023-11-08 NOTE — PROGRESS NOTES
New patient, Patient was seen in ED 10/25 for Bartholin cyst. Patient states that the cyst is still inflamed. Patient states she needs IUD checked has been in for about 12 years. Does not remember last Gyno.  Last pap was 7/19/2016

## 2023-11-30 ENCOUNTER — TELEPHONE (OUTPATIENT)
Dept: OBGYN | Age: 38
End: 2023-11-30

## 2023-11-30 NOTE — TELEPHONE ENCOUNTER
Called and left patient a  with number to call her insurance plan. [phone]. We received notification from her health plan via fax. They will only cover her IUD through the  [facility] or [facility].

## 2024-01-05 ENCOUNTER — HOSPITAL ENCOUNTER (EMERGENCY)
Age: 39
Discharge: HOME | End: 2024-01-06
Attending: STUDENT IN AN ORGANIZED HEALTH CARE EDUCATION/TRAINING PROGRAM
Payer: COMMERCIAL

## 2024-01-05 DIAGNOSIS — N75.0 CYST OF LEFT BARTHOLIN'S GLAND: Primary | ICD-10-CM

## 2024-01-05 PROCEDURE — 96374 THER/PROPH/DIAG INJ IV PUSH: CPT

## 2024-01-05 PROCEDURE — 99285 EMERGENCY DEPT VISIT HI MDM: CPT

## 2024-01-05 PROCEDURE — 56420 I&D BARTHOLINS GLAND ABSCESS: CPT

## 2024-01-05 PROCEDURE — 96375 TX/PRO/DX INJ NEW DRUG ADDON: CPT

## 2024-01-05 PROCEDURE — 96376 TX/PRO/DX INJ SAME DRUG ADON: CPT

## 2024-01-05 RX ORDER — 0.9 % SODIUM CHLORIDE 0.9 %
1000 INTRAVENOUS SOLUTION INTRAVENOUS ONCE
Status: COMPLETED | OUTPATIENT
Start: 2024-01-05 | End: 2024-01-06

## 2024-01-05 RX ORDER — ONDANSETRON 2 MG/ML
4 INJECTION INTRAMUSCULAR; INTRAVENOUS ONCE
Status: COMPLETED | OUTPATIENT
Start: 2024-01-05 | End: 2024-01-06

## 2024-01-05 RX ORDER — FENTANYL CITRATE 50 UG/ML
50 INJECTION, SOLUTION INTRAMUSCULAR; INTRAVENOUS ONCE
Status: COMPLETED | OUTPATIENT
Start: 2024-01-05 | End: 2024-01-06

## 2024-01-05 ASSESSMENT — PAIN DESCRIPTION - LOCATION: LOCATION: GROIN

## 2024-01-05 ASSESSMENT — PAIN SCALES - GENERAL: PAINLEVEL_OUTOF10: 7

## 2024-01-06 ENCOUNTER — APPOINTMENT (OUTPATIENT)
Dept: CT IMAGING | Age: 39
End: 2024-01-06
Payer: COMMERCIAL

## 2024-01-06 VITALS
HEART RATE: 102 BPM | SYSTOLIC BLOOD PRESSURE: 110 MMHG | TEMPERATURE: 98 F | OXYGEN SATURATION: 97 % | BODY MASS INDEX: 25.01 KG/M2 | RESPIRATION RATE: 16 BRPM | HEIGHT: 68 IN | WEIGHT: 165 LBS | DIASTOLIC BLOOD PRESSURE: 65 MMHG

## 2024-01-06 LAB
ALBUMIN SERPL-MCNC: 4.4 G/DL (ref 3.5–5.2)
ALP SERPL-CCNC: 54 U/L (ref 35–104)
ALT SERPL-CCNC: 10 U/L (ref 0–32)
ANION GAP SERPL CALCULATED.3IONS-SCNC: 10 MMOL/L (ref 7–16)
AST SERPL-CCNC: 11 U/L (ref 0–31)
BACTERIA URNS QL MICRO: ABNORMAL
BASOPHILS # BLD: 0.04 K/UL (ref 0–0.2)
BASOPHILS NFR BLD: 0 % (ref 0–2)
BILIRUB SERPL-MCNC: 0.3 MG/DL (ref 0–1.2)
BILIRUB UR QL STRIP: NEGATIVE
BUN SERPL-MCNC: 12 MG/DL (ref 6–20)
CALCIUM SERPL-MCNC: 9.2 MG/DL (ref 8.6–10.2)
CHLORIDE SERPL-SCNC: 99 MMOL/L (ref 98–107)
CLARITY UR: CLEAR
CO2 SERPL-SCNC: 25 MMOL/L (ref 22–29)
COLOR UR: YELLOW
CREAT SERPL-MCNC: 0.8 MG/DL (ref 0.5–1)
EOSINOPHIL # BLD: 0.01 K/UL (ref 0.05–0.5)
EOSINOPHILS RELATIVE PERCENT: 0 % (ref 0–6)
ERYTHROCYTE [DISTWIDTH] IN BLOOD BY AUTOMATED COUNT: 14.6 % (ref 11.5–15)
GFR SERPL CREATININE-BSD FRML MDRD: >60 ML/MIN/1.73M2
GLUCOSE SERPL-MCNC: 104 MG/DL (ref 74–99)
GLUCOSE UR STRIP-MCNC: NEGATIVE MG/DL
HCG, URINE, POC: NEGATIVE
HCT VFR BLD AUTO: 32.5 % (ref 34–48)
HGB BLD-MCNC: 10.3 G/DL (ref 11.5–15.5)
HGB UR QL STRIP.AUTO: NEGATIVE
IMM GRANULOCYTES # BLD AUTO: 0.09 K/UL (ref 0–0.58)
IMM GRANULOCYTES NFR BLD: 1 % (ref 0–5)
KETONES UR STRIP-MCNC: ABNORMAL MG/DL
LACTATE BLDV-SCNC: 0.7 MMOL/L (ref 0.5–2.2)
LEUKOCYTE ESTERASE UR QL STRIP: NEGATIVE
LIPASE SERPL-CCNC: 23 U/L (ref 13–60)
LYMPHOCYTES NFR BLD: 1.82 K/UL (ref 1.5–4)
LYMPHOCYTES RELATIVE PERCENT: 12 % (ref 20–42)
Lab: NORMAL
MCH RBC QN AUTO: 25.9 PG (ref 26–35)
MCHC RBC AUTO-ENTMCNC: 31.7 G/DL (ref 32–34.5)
MCV RBC AUTO: 81.9 FL (ref 80–99.9)
MONOCYTES NFR BLD: 1.45 K/UL (ref 0.1–0.95)
MONOCYTES NFR BLD: 10 % (ref 2–12)
NEGATIVE QC PASS/FAIL: NORMAL
NEUTROPHILS NFR BLD: 77 % (ref 43–80)
NEUTS SEG NFR BLD: 11.68 K/UL (ref 1.8–7.3)
NITRITE UR QL STRIP: NEGATIVE
PH UR STRIP: 7 [PH] (ref 5–9)
PLATELET # BLD AUTO: 261 K/UL (ref 130–450)
PMV BLD AUTO: 10.7 FL (ref 7–12)
POSITIVE QC PASS/FAIL: NORMAL
POTASSIUM SERPL-SCNC: 3.8 MMOL/L (ref 3.5–5)
PROT SERPL-MCNC: 6.5 G/DL (ref 6.4–8.3)
PROT UR STRIP-MCNC: NEGATIVE MG/DL
RBC # BLD AUTO: 3.97 M/UL (ref 3.5–5.5)
RBC #/AREA URNS HPF: ABNORMAL /HPF
SODIUM SERPL-SCNC: 134 MMOL/L (ref 132–146)
SP GR UR STRIP: 1.02 (ref 1–1.03)
UROBILINOGEN UR STRIP-ACNC: 0.2 EU/DL (ref 0–1)
WBC #/AREA URNS HPF: ABNORMAL /HPF
WBC OTHER # BLD: 15.1 K/UL (ref 4.5–11.5)

## 2024-01-06 PROCEDURE — 96374 THER/PROPH/DIAG INJ IV PUSH: CPT

## 2024-01-06 PROCEDURE — 81001 URINALYSIS AUTO W/SCOPE: CPT

## 2024-01-06 PROCEDURE — 83605 ASSAY OF LACTIC ACID: CPT

## 2024-01-06 PROCEDURE — 96375 TX/PRO/DX INJ NEW DRUG ADDON: CPT

## 2024-01-06 PROCEDURE — 6360000002 HC RX W HCPCS

## 2024-01-06 PROCEDURE — 6360000004 HC RX CONTRAST MEDICATION: Performed by: RADIOLOGY

## 2024-01-06 PROCEDURE — 83690 ASSAY OF LIPASE: CPT

## 2024-01-06 PROCEDURE — 2580000003 HC RX 258

## 2024-01-06 PROCEDURE — 2500000003 HC RX 250 WO HCPCS

## 2024-01-06 PROCEDURE — 80053 COMPREHEN METABOLIC PANEL: CPT

## 2024-01-06 PROCEDURE — 96376 TX/PRO/DX INJ SAME DRUG ADON: CPT

## 2024-01-06 PROCEDURE — 74177 CT ABD & PELVIS W/CONTRAST: CPT

## 2024-01-06 PROCEDURE — 85025 COMPLETE CBC W/AUTO DIFF WBC: CPT

## 2024-01-06 RX ORDER — MORPHINE SULFATE 4 MG/ML
4 INJECTION, SOLUTION INTRAMUSCULAR; INTRAVENOUS ONCE
Status: COMPLETED | OUTPATIENT
Start: 2024-01-06 | End: 2024-01-06

## 2024-01-06 RX ORDER — MORPHINE SULFATE 4 MG/ML
INJECTION, SOLUTION INTRAMUSCULAR; INTRAVENOUS
Status: COMPLETED
Start: 2024-01-06 | End: 2024-01-06

## 2024-01-06 RX ORDER — MIDAZOLAM HYDROCHLORIDE 2 MG/2ML
2 INJECTION, SOLUTION INTRAMUSCULAR; INTRAVENOUS ONCE
Status: COMPLETED | OUTPATIENT
Start: 2024-01-06 | End: 2024-01-06

## 2024-01-06 RX ORDER — FENTANYL CITRATE 50 UG/ML
50 INJECTION, SOLUTION INTRAMUSCULAR; INTRAVENOUS ONCE
Status: COMPLETED | OUTPATIENT
Start: 2024-01-06 | End: 2024-01-06

## 2024-01-06 RX ORDER — SULFAMETHOXAZOLE AND TRIMETHOPRIM 800; 160 MG/1; MG/1
1 TABLET ORAL 2 TIMES DAILY
Qty: 14 TABLET | Refills: 0 | Status: SHIPPED | OUTPATIENT
Start: 2024-01-06 | End: 2024-01-13

## 2024-01-06 RX ORDER — HYDROCODONE BITARTRATE AND ACETAMINOPHEN 5; 325 MG/1; MG/1
1 TABLET ORAL EVERY 6 HOURS PRN
Qty: 12 TABLET | Refills: 0 | Status: SHIPPED | OUTPATIENT
Start: 2024-01-06 | End: 2024-01-09

## 2024-01-06 RX ADMIN — IOPAMIDOL 75 ML: 755 INJECTION, SOLUTION INTRAVENOUS at 03:44

## 2024-01-06 RX ADMIN — MORPHINE SULFATE 4 MG: 4 INJECTION, SOLUTION INTRAMUSCULAR; INTRAVENOUS at 01:40

## 2024-01-06 RX ADMIN — ONDANSETRON 4 MG: 2 INJECTION INTRAMUSCULAR; INTRAVENOUS at 00:20

## 2024-01-06 RX ADMIN — FENTANYL CITRATE 50 MCG: 50 INJECTION, SOLUTION INTRAMUSCULAR; INTRAVENOUS at 03:58

## 2024-01-06 RX ADMIN — FENTANYL CITRATE 50 MCG: 50 INJECTION, SOLUTION INTRAMUSCULAR; INTRAVENOUS at 00:21

## 2024-01-06 RX ADMIN — MORPHINE SULFATE 4 MG: 4 INJECTION, SOLUTION INTRAMUSCULAR; INTRAVENOUS at 05:29

## 2024-01-06 RX ADMIN — LIDOCAINE HYDROCHLORIDE 20 ML: 10; .005 INJECTION, SOLUTION EPIDURAL; INFILTRATION; INTRACAUDAL; PERINEURAL at 05:30

## 2024-01-06 RX ADMIN — MIDAZOLAM HYDROCHLORIDE 2 MG: 1 INJECTION, SOLUTION INTRAMUSCULAR; INTRAVENOUS at 05:12

## 2024-01-06 RX ADMIN — SODIUM CHLORIDE 1000 ML: 9 INJECTION, SOLUTION INTRAVENOUS at 00:19

## 2024-01-06 ASSESSMENT — PAIN DESCRIPTION - LOCATION
LOCATION: GROIN
LOCATION: VAGINA

## 2024-01-06 ASSESSMENT — LIFESTYLE VARIABLES
HOW OFTEN DO YOU HAVE A DRINK CONTAINING ALCOHOL: MONTHLY OR LESS
HOW MANY STANDARD DRINKS CONTAINING ALCOHOL DO YOU HAVE ON A TYPICAL DAY: 1 OR 2

## 2024-01-06 ASSESSMENT — PAIN SCALES - GENERAL
PAINLEVEL_OUTOF10: 8
PAINLEVEL_OUTOF10: 6
PAINLEVEL_OUTOF10: 7
PAINLEVEL_OUTOF10: 10
PAINLEVEL_OUTOF10: 8
PAINLEVEL_OUTOF10: 9

## 2024-01-06 ASSESSMENT — PAIN DESCRIPTION - DESCRIPTORS
DESCRIPTORS: GNAWING
DESCRIPTORS: ACHING;CRAMPING;DISCOMFORT
DESCRIPTORS: PRESSURE;STABBING
DESCRIPTORS: SHARP

## 2024-01-06 NOTE — DISCHARGE INSTRUCTIONS
Thank you for the opportunity to serve in your medical care today. Please be sure to take your prescribed medication as directed. Follow up with your doctor is critical for optimal healing. Should you have any new or worsening symptoms, please return to the Emergency Department for further work-up and evaluation.

## 2024-01-06 NOTE — ED PROVIDER NOTES
[facility] EMERGENCY DEPARTMENT  EMERGENCY DEPARTMENT ENCOUNTER        Pt Name: Indigo Golden  MRN: [id number]  Birthdate 1985  Date of evaluation: 1/5/2024  Provider: Jim Taylor MD  PCP: No primary care provider on file.  Note Started: 11:01 PM EST 1/5/24    CHIEF COMPLAINT       Chief Complaint   Patient presents with    Groin Pain     States she has a cyst on her vaginal wall,     Fever     feeling \"feverish and nauseated\"     HISTORY OF PRESENT ILLNESS: 1 or more Elements   History From: Patient    Limitations to history : None    Indigo Golden is a 38 y.o. female with past medical history of anxiety, depression, PTSD who presents with complaints of left-sided groin pain has been ongoing for some time but has worsened today.  Patient states he has a history of Bartholin gland cyst that has not been drained previously.  Patient states she is concerned she has a cyst on her left side of vaginal wall.  Patient also endorses some nausea but no emesis.  Patient states she has felt feverish but denies any known fevers.  Patient states she has not had a bowel movement due to pain but has urinated without dysuria or hematuria.  Patient denies any chest pain, shortness of breath, diarrhea, constipation, numbness or tingling to extremities, dizziness or lightheadedness, headache, blurry vision, vaginal bleeding or discharge, concern for STD, discharge from possible cyst site.  Patient denies any tobacco, EtOH, or drug use.    Nursing Notes were all reviewed and agreed with or any disagreements were addressed in the HPI.    ROS:   Pertinent positives and negatives are stated within HPI, all other systems reviewed and are negative.    --------------------------------------------- PAST HISTORY ---------------------------------------------  Past Medical History:  has a past medical history of Anemia, Anxiety, PTSD (post-traumatic stress disorder), and Seasonal affective

## 2024-01-17 ENCOUNTER — PROCEDURE VISIT (OUTPATIENT)
Dept: OBGYN | Age: 39
End: 2024-01-17
Payer: COMMERCIAL

## 2024-01-17 VITALS
HEART RATE: 106 BPM | SYSTOLIC BLOOD PRESSURE: 118 MMHG | WEIGHT: 165 LBS | DIASTOLIC BLOOD PRESSURE: 73 MMHG | BODY MASS INDEX: 25.46 KG/M2

## 2024-01-17 DIAGNOSIS — Z01.419 ENCOUNTER FOR ANNUAL ROUTINE GYNECOLOGICAL EXAMINATION: Primary | ICD-10-CM

## 2024-01-17 DIAGNOSIS — N76.0 ACUTE VAGINITIS: ICD-10-CM

## 2024-01-17 PROCEDURE — 99395 PREV VISIT EST AGE 18-39: CPT | Performed by: OBSTETRICS & GYNECOLOGY

## 2024-01-17 PROCEDURE — 99213 OFFICE O/P EST LOW 20 MIN: CPT | Performed by: OBSTETRICS & GYNECOLOGY

## 2024-01-17 ASSESSMENT — ENCOUNTER SYMPTOMS
VOMITING: 0
WHEEZING: 0
NAUSEA: 0
CONSTIPATION: 0
ABDOMINAL PAIN: 0
BLOOD IN STOOL: 0
COUGH: 0
DIARRHEA: 0
SHORTNESS OF BREATH: 0

## 2024-01-17 ASSESSMENT — PATIENT HEALTH QUESTIONNAIRE - PHQ9
6. FEELING BAD ABOUT YOURSELF - OR THAT YOU ARE A FAILURE OR HAVE LET YOURSELF OR YOUR FAMILY DOWN: 0
4. FEELING TIRED OR HAVING LITTLE ENERGY: 1
SUM OF ALL RESPONSES TO PHQ QUESTIONS 1-9: 7
9. THOUGHTS THAT YOU WOULD BE BETTER OFF DEAD, OR OF HURTING YOURSELF: 0
SUM OF ALL RESPONSES TO PHQ QUESTIONS 1-9: 7
SUM OF ALL RESPONSES TO PHQ9 QUESTIONS 1 & 2: 2
SUM OF ALL RESPONSES TO PHQ QUESTIONS 1-9: 7
8. MOVING OR SPEAKING SO SLOWLY THAT OTHER PEOPLE COULD HAVE NOTICED. OR THE OPPOSITE, BEING SO FIGETY OR RESTLESS THAT YOU HAVE BEEN MOVING AROUND A LOT MORE THAN USUAL: 1
7. TROUBLE CONCENTRATING ON THINGS, SUCH AS READING THE NEWSPAPER OR WATCHING TELEVISION: 1
3. TROUBLE FALLING OR STAYING ASLEEP: 1
5. POOR APPETITE OR OVEREATING: 1
SUM OF ALL RESPONSES TO PHQ QUESTIONS 1-9: 7
2. FEELING DOWN, DEPRESSED OR HOPELESS: 1
1. LITTLE INTEREST OR PLEASURE IN DOING THINGS: 1

## 2024-01-17 NOTE — PROGRESS NOTES
Indigo Golden is a 38 yr  female whose LMP was 2023 she does have the 12 years ParaGard IUD in place for over.  Reports no change in PMH, FH or surgical history.  Recent ER visit 2024 required Gonzalez catheter insertion after I&D of a Bartholin's abscess.  Positive history anxiety, and depression.  Last Pap approximately 8 years ago.      Patient presents for annual exam.     Past Medical History:   Diagnosis Date    Anemia     Anxiety     PTSD (post-traumatic stress disorder)     Seasonal affective disorder (H[facility])         History reviewed. No pertinent surgical history.     Family History   Problem Relation Age of Onset    Schizophrenia Mother     Bipolar Disorder Mother     Other Father         PTSD    Other Maternal Grandmother         Pancreatitis    Stomach Cancer Paternal Grandmother     Heart Failure Paternal Grandfather           Current Outpatient Medications:     lamoTRIgine (LAMICTAL) 200 MG tablet, Take 1 tablet by mouth 2 times daily, Disp: , Rfl:     Lisdexamfetamine Dimesylate (VYVANSE) 20 MG CAPS, Take 1 capsule by mouth daily., Disp: , Rfl:     docusate sodium (COLACE) 100 MG capsule, Take 1 capsule by mouth nightly, Disp: , Rfl:     naproxen (NAPROSYN) 500 MG tablet, Take 1 tablet by mouth 2 times daily for 7 days, Disp: 14 tablet, Rfl: 0     Allergies   Allergen Reactions    Ritalin [Methylphenidate Hcl] Other (See Comments)     Cant walk or talk    Adderall [Amphetamine-Dextroamphetamine] Anxiety    Ambien [Zolpidem] Anxiety        Social History       Tobacco History       Smoking Status  Former Quit Date  2015 Smoking Tobacco Type  Cigarettes quit in 2015   Pack Year History     Packs/Day From To Years    0 2015  9.0    0.5   0.0      Smokeless Tobacco Use  Never              Alcohol History       Alcohol Use Status  Yes Comment  socially              Drug Use       Drug Use Status  No              Sexual Activity       Sexually Active  Yes Partners  Male

## 2024-01-17 NOTE — PROGRESS NOTES
Here today for annual exam and f/u from the hospital.  Patient had surgery for bartholin cyst and had drain in currently.  Pap smear and culture obtained, labeled and sent to the lab.  Discharge instructions have been discussed with the patient. Patient advised to call our office with any questions or concerns.   Voiced understanding.

## 2024-01-18 LAB
HPV SAMPLE: NORMAL
HPV SOURCE: NORMAL
HPV, GENOTYPE 16: NORMAL
HPV, GENOTYPE 18: NORMAL
HPV, HIGH RISK OTHER: NORMAL
HPV, INTERPRETATION: NORMAL

## 2024-01-19 ENCOUNTER — TELEPHONE (OUTPATIENT)
Dept: OBGYN | Age: 39
End: 2024-01-19

## 2024-01-19 RX ORDER — METRONIDAZOLE 500 MG/1
500 TABLET ORAL 2 TIMES DAILY
Qty: 14 TABLET | Refills: 0 | Status: SHIPPED | OUTPATIENT
Start: 2024-01-19 | End: 2024-01-26

## 2024-01-19 NOTE — TELEPHONE ENCOUNTER
----- Message from Connie Marcial DO sent at 1/19/2024 10:14 AM EST -----  Please let her know culture neg

## 2024-01-21 LAB
CULTURE: ABNORMAL
SPECIMEN DESCRIPTION: ABNORMAL

## 2024-01-22 ENCOUNTER — TELEPHONE (OUTPATIENT)
Dept: OBGYN | Age: 39
End: 2024-01-22

## 2024-01-22 NOTE — TELEPHONE ENCOUNTER
----- Message from Connie Marcial DO sent at 1/19/2024  3:54 PM EST -----  Regarding: culture  Please let her know vag culture returned pos for BV I am sending in flagyl 500 bid x 7 to her pharmacy. BV is just overgrowth of her normal bacteria  ----- Message -----  From: Lena Mario LPN  Sent: 1/19/2024   1:55 PM EST  To: Connie Marcial DO    notified

## 2024-01-22 NOTE — TELEPHONE ENCOUNTER
Called and notified patient and she said she would like to speak to you regarding the BV and her partner. Also wanted to make sure she should be taking the antibiotic with the word catheter because she said you previously told her NOT to take antibiotics. She would prefer to speak to the provider.

## 2024-01-23 ENCOUNTER — TELEPHONE (OUTPATIENT)
Dept: OBGYN | Age: 39
End: 2024-01-23

## 2024-01-23 LAB
GYNECOLOGY CYTOLOGY REPORT: NORMAL
HPV SAMPLE: ABNORMAL
HPV SOURCE: ABNORMAL
HPV, GENOTYPE 16: NOT DETECTED
HPV, GENOTYPE 18: NOT DETECTED
HPV, HIGH RISK OTHER: DETECTED
HPV, INTERPRETATION: ABNORMAL

## 2024-01-23 NOTE — TELEPHONE ENCOUNTER
Called and notified patient and she said she would like to speak to you regarding the BV and her partner. Also wanted to make sure she should be taking the antibiotic with the word catheter because she said you previously told her NOT to take antibiotics. She would prefer to speak to the provider   \  This message was from the other day. Somehow it did not get routed and she called again and left a vm. Can you please call her      No

## 2024-02-14 ENCOUNTER — PROCEDURE VISIT (OUTPATIENT)
Dept: OBGYN | Age: 39
End: 2024-02-14
Payer: COMMERCIAL

## 2024-02-14 VITALS
BODY MASS INDEX: 26.66 KG/M2 | WEIGHT: 172.8 LBS | DIASTOLIC BLOOD PRESSURE: 78 MMHG | HEART RATE: 87 BPM | SYSTOLIC BLOOD PRESSURE: 112 MMHG

## 2024-02-14 DIAGNOSIS — N75.1 BARTHOLIN'S GLAND ABSCESS: Primary | ICD-10-CM

## 2024-02-14 PROCEDURE — G8484 FLU IMMUNIZE NO ADMIN: HCPCS | Performed by: OBSTETRICS & GYNECOLOGY

## 2024-02-14 PROCEDURE — G8419 CALC BMI OUT NRM PARAM NOF/U: HCPCS | Performed by: OBSTETRICS & GYNECOLOGY

## 2024-02-14 PROCEDURE — G8427 DOCREV CUR MEDS BY ELIG CLIN: HCPCS | Performed by: OBSTETRICS & GYNECOLOGY

## 2024-02-14 PROCEDURE — 99213 OFFICE O/P EST LOW 20 MIN: CPT | Performed by: OBSTETRICS & GYNECOLOGY

## 2024-02-14 PROCEDURE — 99214 OFFICE O/P EST MOD 30 MIN: CPT | Performed by: OBSTETRICS & GYNECOLOGY

## 2024-02-14 PROCEDURE — 1036F TOBACCO NON-USER: CPT | Performed by: OBSTETRICS & GYNECOLOGY

## 2024-02-14 NOTE — PROGRESS NOTES
Indigo Golden is a 38-year-old G1, P1 female whose LMP is 2-8-2024.  She has the ParaGard IUD in place for over 12 years.  She presented in January with a Bartholin's abscess in the ER had placed a Gonzalez catheter.  She presents today for removal.  No concerns issues or changes in her history.      Past Medical History:   Diagnosis Date    Anemia     Anxiety     PTSD (post-traumatic stress disorder)     Seasonal affective disorder (HCC)         History reviewed. No pertinent surgical history.     Family History   Problem Relation Age of Onset    Schizophrenia Mother     Bipolar Disorder Mother     Other Father         PTSD    Other Maternal Grandmother         Pancreatitis    Stomach Cancer Paternal Grandmother     Heart Failure Paternal Grandfather         Social History       Tobacco History       Smoking Status  Former Quit Date  2/4/2015 Smoking Tobacco Type  Cigarettes quit in 2/4/2015   Pack Year History     Packs/Day From To Years    0 2/4/2015  9.0    0.5   0.0      Smokeless Tobacco Use  Never                  Sexual Activity       Sexually Active  Yes Partners  Male                    Allergies   Allergen Reactions    Ritalin [Methylphenidate Hcl] Other (See Comments)     Cant walk or talk    Adderall [Amphetamine-Dextroamphetamine] Anxiety    Ambien [Zolpidem] Anxiety        Vitals:    02/14/24 1051   BP: 112/78   Pulse: 87        Physical Exam:  General:   Alert and oriented no acute distress  Vulva without lesions  Gonzalez catheter in place on the right side.  1 cc syringe with needle was inserted into the Gonzalez catheter the fluid was removed and the catheter was removed intact.  No issues were noted.                                                          Indigo was seen today for procedure.    Diagnoses and all orders for this visit:    Bartholin's gland abscess      Asked her to call if she has any questions or concerns otherwise should return for annual exam in November.    Return for annual.     Connie

## 2024-02-14 NOTE — PROGRESS NOTES
Patient here today for Word catheter removal.  Consents signed and time out done.  Pregnancy test not obtained per provider.  Procedure done by Dr Marcial. See providers notes. Patient left in stable condition. Discharge instructions given and patient instructed to call the office if any questions and or concerns.

## 2024-07-11 NOTE — PATIENT INSTRUCTIONS
Subjective:       Patient ID: Rupa Vanegas is a 38 y.o. female.    Chief Complaint: Migraine    INTERVAL HISTORY 07/11/2024  The patient presents for follow up. She is stable but still quite disabled. Botox on board. Ajovy, AImovy, Emgality and Qulipta all ineffective. She inquires about benefit from Ketamine. Vyepti co-pay unaffordable. Norcasc, Verapamil and Topamax 100 mg BID on board Acute attacks managed with Relpax or Ubrelvy. No change in habitual headche pattern. No red flags. Otherwise information below is still accurate and current.    INTERVAL HISTORY 12/28/2023  The patient presents for follow up. She states that is the same or worse. 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 She is on Ajovy and S/P one round of Botox. Also on Norvasc, Verapamil, Topamax. Nerivio ineffective, she stopped using it. For acute attacks she uses Relpax and Ubrelvy. Current headache is a level 6-7/0-10, it affects the left occipital region radiating anteriorly also affecting the sinus region. No photo or phonophobia. Vyepti requested but not approved by insurance. Otherwise information below is still accurate and current.    INTERVAL HISTORY 10/4/2023  The patient location is: Home  The chief complaint leading to consultation is: Migraine  Visit type: Virtual visit with synchronous audio and video  Total time spent with patient: 15 minutes  The patient was informed of the relationship between the physician and patient and notified that he or she may decline to receive medical services by telemedicine and may withdraw from such care at any time.      The patient presents for virtual follow up. Ajovy seems to work better than Aimovig and Emgailty but still having 15 or more days of headaches per month with at least 8 migraine days. She has tried old traditional medications as well as the new ones and remains quite disabled. Botox was insufficiently tried as she  "developed neck pain and became afraid of repeating the treatment. She is willing to reconsider sparing the trapezii. No change in habitual headche pattern. No red flags. Otherwise information below is still accurate and current.    INTERVAL HISTORY 6/27/2023  The patient presents for follow up. She is "worse." She has a number of systemic co-morbidities that have resulted in general malaise and persistence of severe headaches. She is on Topiramate, Emgality and Qulipta. For acute attacks, she takes Relpax. Intensity of headache ranges 3 to 6 to 10/0-10. No change in habitual headache patterns. MRI of the brain 3/23: No acute intracranial abnormality. Prior right pterional craniotomy with unchanged underlying right anterior temporal resection/encephalomalacia and small focus of right frontal encephalomalacia.She presents to discuss options. Otherwise information below is still accurate and current.    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 She also has a history of seizures which are under control. She's on topiramate and emgality for prevention and relpax, percocet, medical marijuana for acute treatment. Headaches are about the same since last visit, currently 5/10, 3/10 at best and 10/10 at its worst, with persistent headaches 30/30 days per month. She takes the above medications 1-3 times per week when headaches are severe or migraneous.     INTERVAL HISTORY 3/22/2022  The patient presents for virtual follow up. She is on Emgality, only works for 2 weeks, Relpax for acute treatment. Reyvow for rescue, not covered by insurance. In any case, she tried it last year without much success. She presents to discuss options. Otherwise information below is still accurate and current.    HPI  The patient is a 34 y/o " every day. Read and follow all instructions on the label. · Schedule time each day for a bowel movement. A daily routine may help. Take your time having your bowel movement. · Support your feet with a small step stool when you sit on the toilet. This helps flex your hips and places your pelvis in a squatting position. · Your doctor may recommend an over-the-counter laxative to relieve your constipation. Examples are Milk of Magnesia and MiraLax. Read and follow all instructions on the label. Do not use laxatives on a long-term basis. When should you call for help? Call your doctor now or seek immediate medical care if:    · You have new or worse belly pain.     · You have new or worse nausea or vomiting.     · You have blood in your stools.    Watch closely for changes in your health, and be sure to contact your doctor if:    · Your constipation is getting worse.     · You do not get better as expected. Where can you learn more? Go to https://PanelClaw.Optini. org and sign in to your Usentric account. Enter 21 [phone] in the ImmuneXcite box to learn more about \"Constipation: Care Instructions. \"     If you do not have an account, please click on the \"Sign Up Now\" link. Current as of: September 23, 2018  Content Version: 12.0  © 8875-9359 Healthwise, Incorporated. Care instructions adapted under license by [facility] ([facility]). If you have questions about a medical condition or this instruction, always ask your healthcare professional. [place] [address] any warranty or liability for your use of this information. Patient Education        When You Are Overweight: Care Instructions  Your Care Instructions    If you're overweight, your doctor may recommend that you make changes in your eating and exercise habits. Being overweight can lead to serious health problems, such as high blood pressure, heart disease, type 2 diabetes, and arthritis, or it can make these problems worse. "female presenting with chief complaint since she was "4 y/o." Extensive PMHx as reflected in the problem list. 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 She has taken multiple AEDs in the past as well as Elavil, Prozac and Wellbutrin.  Currently on Topamax, 50 mg - 75 mg. Higher doses "scramble her brain." Also on Verapamil 80 mg BID, increased last visit to 240 mg daily, and Botox. She stopped Botox because her PCP and Pain doctor feel that it may contribute to weaken her neck. She describes holocephalic pain with significant involvement on the occipital region and shoulders. Aimovig stopped last visit due to questionable efficacy. Nurtec ineffective. Emgality started last visit. She has tried all the triptans, recently back on Relpax. Her lifestyle is stressful. She is a mother to two young children. She drinks 5 to 6 shots of coffee equivalent to 280 mg to 420 mg. She grew up on [employer] and Mountain dew. Heavy caffeine user all her life. Previous neurologist, Dr. Lyssa David. Last imaging, head CT showing stable temporal lobe encephalomalacia. Please see details of headache characteristics below.    Headache questionnaire    1. When did your Headaches start?    3 yrs, 1989ish    2. Where are your headaches located?   Neck, shoulders, all over head    3. Your headache's characteristics:    Pressure, Throbbing, Pounding, Stabbing, Like a tight band, Sharp    4. How long does the headache last?   years    5. How often does the headache occur?   continuously    6. Are your headaches preceded or accompanied by other symptoms? yes   If yes, please describe.  Vertigo and nausea    7. Does the headache awaken you at night?    If so, how often? Every few weeks      8. Please brody the word that best describes your headache's intensity:    severe    9. Using a scale of 1 through 10, " trying to reach a healthy weight, changing how you think about certain things may help. Here are some ideas:  ? Don't compare yourself to others. Healthy bodies come in all shapes and sizes. ? Pay attention to how hungry or full you feel. When you eat, be aware of why you're eating and how much you're eating. ? Focus on improving your health instead of dieting. Dieting almost never works over the long term. · Ask your doctor about other health professionals who can help you reach a healthy weight. ? A dietitian can help you make healthy changes in your diet. ? An exercise specialist or  can help you develop a safe and effective exercise program.  ? A counselor or psychiatrist can help you cope with issues such as depression, anxiety, or family problems that can make it hard to focus on reaching a healthy weight. · Get support from your family, your doctor, your friends, a support group--and support yourself. Where can you learn more? Go to https://AlphaSights.Ariagora. org and sign in to your Heroku account. Enter L583 in the trueEX box to learn more about \"When You Are Overweight: Care Instructions. \"     If you do not have an account, please click on the \"Sign Up Now\" link. Current as of: June 25, 2018  Content Version: 12.0  © 6670-6636 Healthwise, Incorporated. Care instructions adapted under license by [facility]. If you have questions about a medical condition or this instruction, always ask your healthcare professional. [place] [address] any warranty or liability for your use of this information. with 0 = no pain and 10 = the worst pain:   What score is your headache now? 7   What score is your headache at its worst? 0   What score is your headache at its best? 3    10. Possible associated headache symptoms:  [x]  Sensitivity to light  [x] Dizziness  [x] Nasal or sinus pressure/ pain   [x] Sensitivity to noise  [x] Vertigo  [] Problems with concentration  [] Sensitivity to smells  [x] Ringing in ears  [] Problems with memory    [] Blurred vision  [x] Irritability  [] Problems with task completion   [] Double vision  [x] Anger  [x]  Problems with relaxation  [x] Loss of appetite  [] Anxiety  [x] Neck tightness, Neck pain  [x] Nausea   [x] Nasal congestion  [] Vomiting         11. Headache improving factors:  [x] Sleep  [] Heat  [x] Darkness  [x] Ice  [x] Local pressure [] Menses (period)  [] Massage   [x] Medications:        12. Headache worsening factors:   [x] Fatigue [x] Sneezing  [x] Changes in Weather  [x] Light [x] Bending Over [x] Stress  [x] Noise [x] Ovulation  [] Multiple Sclerosis Flare-Up  [x] Smells  [] Menses  [] Food   [x] Coughing [] Alcohol      13. Number of caffeinated drinks per day: 5 or 6 shots +/- depending or children      14. Number of diet drinks per day:  0      Please Check any Medications or Procedures tried/failed for Headache    AED Neuromodulators  MAOIs  Ergot Alkaloids    Acetazolamide (Diamox) [] Phenelzine (Nardil) [] Dihydroergotamine (Migranal) []   Carbamazepine (Tegretol) [x] Tranylcypromine (Parnate) [] Ergotamine (Ergomar) []   Gabapentin (Neurontin) [] Antihistamine/Serotonergic  Triptans    Lacosamide (Vimpat) [x] Cyproheptadine (Periactin) [] Almotriptan (Axert) []   Lamotrigine (Lamictal) [x] Antihypertensives  Eletriptan (Relpax) [x]   Levatiracetam (Keppra) [] Atenolol (Tenormin) [] Frovatriptan (Frova) []   Oxcarbazepine (Trileptal) [x] Bisoprostol (Zebeta) [] Naratriptan (Amerge) []   Phenobarbital [] Candesartan (Atacand) [] Rizatriptan (Maxalt) [x]      Nebivolol (Bystolic)  Sumatriptan (Imitrex) [x]   Levetiracetam (Keppra) x Cardeilol (Coreg) [] Zolmitriptan (Zomig) [x]   Phenytoin (Dilantin) [x] Diltiazem (Cardizem) []     Pregabalin (Lyrica) [x] Lisinopril (Prinivil, Zestril) [] Combo Abortives    Primidone (Mysoline) [x] Metoprolol (Toprol) [] BC Powder []   Tiagabine (Gabatril) [] Nadolol (Corgard) [] Butalbital and Acetaminophen (Bupap) [x]   Topiramate (Topamax)  (Trokendi) [x] Nicardipine (Cardene) []     Vigabatrin (Sabril) [] Nimodipine (Nimotop) [] Butalbital, Acetaminophen, and caffeine (Fioricet) [x]   Valproic Acid (Depakote) (Divalproex Sodium) [x] Propranolol (Inderal) []     Zonisamide (Zonegran) [x] Telmisartan (Micardis) [] Butalbital, Aspirin, and caffeine (Fiorinal) []   Benzodiazepines  Timolol (Blocadren) []     Alprazolam (Xanax) [x] Verapamil (Calan, Verelan) [x] Butalbital, Caffeine, Acetaminophen, and Codeine (Fioricet with Codeine) []   Diazepam (Valium) [] NSAIDs      Lorazepam (Ativan) [] Acetaminophen (Tylenol) []     Clonazepam (Klonopin) [] Acetylsalicylic Acid (Aspirin) [x] Butalbital, Caffeine, Aspirin, and Codeine  (Fiorinal with Codeine) []   Antidepressants  Diclofenac (Cambia) []     Amitriptyline (Elavil) [x] Ibuprofen (Motrin) [x]     Desipramine (Norpramin) [] Indomethacin (Indocin) [] Aspirin, Caffeine, and Acetaminophen (Excedrin) (Goodys) [x]   Doxepin (Sinequan) [] Ketoprofen (Orudis) []     Fluoxetine (Prozac) [x] Ketorolac (Toradol) [x] Acetaminophen, Dichloralphenazone, and Isometheptene (Midrin) []   Imipramine (Tofranil) [] Naproxen (Anaprox) (Aleve) [x]     Nortriptyline (Pamelor) [] Meclofenamic Acid (Meclomen) []     Venlafaxine (Effexor) [] Meloxicam (Mobic) [] Procedures    Desvenlafazine (Pristiq) [] Monoclonals  Greater occipital nerve block []   Duloxetine (Cymbalta) [] Eptinezumab [] Cervical, Thoracic, Lumbar radiofrequency ablation [x]   Trazadone [] Erenumab-aooe (Aimovig) [x] Spenopalatine ganglion  block []   Wellbutrin [x] Galcanezumab (Emgality) [] Occipital neuro stimulation []   Protriptyline (Vivactil) [] Fremanazumab-vfrm (Ajovy)  Cervical, Thoracic, Lumbar, Caudal Epidural steroid injection []   Escitalopram (Lexapro) [] Other [] Sacroiliac joint steroid injection []   Celexa [] Memantine (Namenda) [] Transforaminal epidural steroid injection []   Gabapentin x Botox [x] Facet joint injections []   Nurtec x Baclofen (Lioresal) [x] Cervical, Thoracic, Lumbar medial branch blocks [x]       Cefaly []       Gamma Core []       Iovera []       Transcranial Magnetic stimulation []       Botox x                  Review of Systems   Constitutional: Positive for fatigue and unexpected weight change. Negative for activity change, appetite change and fever.   HENT: Positive for intermittent tinnitus. Negative for congestion, dental problem, hearing loss, sinus pressure, trouble swallowing and voice change.    Eyes: Negative for photophobia, pain, redness and visual disturbance.   Respiratory: Negative for cough, chest tightness and shortness of breath.    Cardiovascular:  chest pain, Negative for palpitations and leg swelling.   Gastrointestinal: Negative for abdominal pain, blood in stool, nausea and vomiting.   Endocrine: Negative for cold intolerance and heat intolerance.   Genitourinary: Positive for dysuria. Negative for difficulty urinating, frequency, menstrual problem and urgency.   Musculoskeletal: Positive for arthralgias and myalgias. Negative for back pain, gait problem, joint swelling, neck pain and neck stiffness.   Skin: Negative.    Neurological: Negative for dizziness, tremors, seizures, syncope, facial asymmetry, speech difficulty, weakness, light-headedness, numbness and headaches.   Hematological: Negative for adenopathy. Does not bruise/bleed easily.   Psychiatric/Behavioral: Positive for dysphoric mood. Negative for agitation, behavioral problems, confusion, decreased concentration,  self-injury, sleep disturbance and suicidal ideas. The patient is not nervous/anxious and is not hyperactive.            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 Lyssa David (Neurologist); last seen last month this year, last reported seizure 2010    Sinus infection     chronic    Spinal stenosis     Asim's disease     carrier     Past Surgical History:   Procedure Laterality Date    ANTERIOR CRUCIATE LIGAMENT REPAIR Left     brain sugery      BRAIN SURGERY      scar tissue from right temporal lobe removed    BREAST CYST ASPIRATION Right      SECTION N/A 2020    Procedure:  SECTION;  Surgeon: Wendy Cooper MD;  Location: [facility] L&D;  Service: OB/GYN;  Laterality: N/A;    CYSTOSCOPY W/ RETROGRADES Left 2018    Procedure: CYSTOSCOPY, WITH RETROGRADE PYELOGRAM;  Surgeon: Martin Stewart MD;  Location: [facility] OR;  Service: Urology;  Laterality: Left;    CYSTOSCOPY W/ URETERAL STENT PLACEMENT Left 2019    Procedure: CYSTOSCOPY, WITH URETERAL STENT INSERTION - Exchange;  Surgeon: Serafin Encarnacion MD;  Location: [facility] OR;  Service: Urology;  Laterality: Left;    CYSTOURETEROSCOPY WITH RETROGRADE PYELOGRAPHY AND INSERTION OF STENT INTO URETER Left 2019    Procedure: CYSTOURETEROSCOPY, WITH RETROGRADE PYELOGRAM AND URETERAL STENT INSERTION;  Surgeon: Martin Stewart MD;  Location: [facility] OR;  Service: Urology;  Laterality: Left;    ESOPHAGOGASTRODUODENOSCOPY N/A 2020    Procedure: EGD (ESOPHAGOGASTRODUODENOSCOPY);  Surgeon: Ty Pal MD;  Location: [facility];   Service: Endoscopy;  Laterality: N/A;    EXCISION OF MASS OF BACK Right 7/7/2021    Procedure: EXCISION, MASS, BACK  low back, Doc confirm side;  Surgeon: Serafin Delaney MD;  Location: [facility] OR;  Service: General;  Laterality: Right;    MOUTH SURGERY      OVARIAN CYST REMOVAL  2013    TYMPANOSTOMY TUBE PLACEMENT      UPPER GASTROINTESTINAL ENDOSCOPY  2017    Dr. Kohler; gastric polyp per pt report    URETEROSCOPY Left 6/21/2018    Procedure: URETEROSCOPY;  Surgeon: Martin Stewart MD;  Location: [facility] OR;  Service: Urology;  Laterality: Left;     Family History   Problem Relation Age of Onset    Kidney disease Mother     Fibromyalgia Mother     Migraines Mother     Ovarian cysts Mother     Hypertension Father     Hyperlipidemia Father     Kidney disease Father     Ovarian cysts Maternal Grandmother     Hyperlipidemia Paternal Grandfather     Hypertension Paternal Grandfather     Diabetes Sister     Diabetes Maternal Aunt     Cancer Paternal Uncle         colon cancer    Diabetes Maternal Grandfather     Cancer Maternal Grandfather     Heart disease Maternal Grandfather     Autism Other      Social History     Socioeconomic History    Marital status:      Spouse name: Not on file    Number of children: Not on file    Years of education: Not on file    Highest education level: Not on file   Occupational History    Not on file   Tobacco Use    Smoking status: Never Smoker    Smokeless tobacco: Never Used   Substance and Sexual Activity    Alcohol use: No    Drug use: No    Sexual activity: Yes   Other Topics Concern    Not on file   Social History Narrative    Not on file     Social Determinants of Health     Financial Resource Strain:     Difficulty of Paying Living Expenses:    Food Insecurity:     Worried About Running Out of Food in the Last Year:     Ran Out of Food in the Last Year:    Transportation Needs:     Lack of Transportation (Medical):     Lack of Transportation (Non-Medical):    Physical  Activity: Sufficiently Active    Days of Exercise per Week: 3 days    Minutes of Exercise per Session: 60 min   Stress: Stress Concern Present    Feeling of Stress : Rather much   Social Connections: Unknown    Frequency of Communication with Friends and Family: Three times a week    Frequency of Social Gatherings with Friends and Family: More than three times a week    Attends Spiritism Services: Not on file    Active Member of Clubs or Organizations: Not on file    Attends Club or Organization Meetings: Not on file    Marital Status: Not on file     Review of patient's allergies indicates:   Allergen Reactions    Lactose Other (See Comments)     Severe stomach cramps    Penicillins Hives and Nausea And Vomiting    Stadol [butorphanol tartrate] Itching           Objective:      Physical Exam    Constitutional:   She appears well-developed and well-nourished. She is well groomed  Vitals:    07/11/24 1102   BP: 117/81   Pulse: 90   Resp: 17   Temp: 97.3 °F (36.3 °C)     Body mass index is 26.91 kg/m².      Neurological Exam:  General: well-developed, well-nourished, no distress  Mental status: Awake and alert  Speech language: No dysarthria or aphasia on conversation  Cranial nerves: Face symmetric  Motor: Moves all extremities well  Coordination: No ataxia. No tremor.   Tongue movements were full      Review of Data:    Lab Results   Component Value Date     06/10/2024    K 4.2 06/10/2024    MG 1.9 07/17/2023     (H) 06/10/2024    CO2 22 (L) 06/10/2024    BUN 7 06/10/2024    CREATININE 1.0 06/10/2024    GLU 71 06/10/2024    HGBA1C 5.4 03/28/2024    AST 19 06/10/2024    AST 23 12/05/2015    ALT 11 06/10/2024    ALBUMIN 3.7 06/10/2024    PROT 6.4 06/10/2024    BILITOT 0.2 06/10/2024    CHOL 153 06/20/2023    HDL 42 06/20/2023    LDLCALC 94.6 06/20/2023    TRIG 82 06/20/2023       Lab Results   Component Value Date    WBC 8.64 06/10/2024    HGB 13.8 06/10/2024    HCT 40.3 06/10/2024    MCV 94 06/10/2024      06/10/2024       Lab Results   Component Value Date    TSH 0.453 04/05/2024           Results for orders placed or performed in visit on 02/12/21   CT Head Without Contrast    Narrative    EXAMINATION:  CT HEAD WITHOUT CONTRAST    CLINICAL HISTORY:  Migraine, unspecified, not intractable, without status migrainosus.  Migraines and left facial numbness.    TECHNIQUE:  Low dose axial images were obtained through the head.  Coronal and sagittal reformations were also performed. Contrast was not administered.  Dose reduction techniques including automatic exposure control (AEC) were utilized.    Dose (DLP): 554 mGycm    COMPARISON:  MRI brain with without contrast, 02/12/2021.  MRI brain with without contrast, 08/25/2018.    FINDINGS:  INTRACRANIAL: Prior pterional craniotomy with underlying right anterior temporal lobe resection/encephalomalacia and mild right frontal encephalomalacia.  This appears stable compared to prior MRI from 08/25/2018.  Gray-white differentiation is otherwise preserved with no acute intracranial abnormality.  No acute intracranial hemorrhage.  No hydrocephalus.  No intracranial mass effect.    SINUSES: Prior bilateral ethmoidectomies, maxillary antrostomies and sphenoidotomies.  Paranasal sinuses and mastoid air cells are essentially clear.    SKULL/SCALP: Old right pterional craniotomy.  Per incidental persistent metopic suture.  Old left medial orbital wall fracture deformity.  No acute calvarial abnormality.    ORBITS: Visualized orbits are normal.      Impression    1. No acute intracranial abnormality.  2. Prior right pterional craniotomy with underlying right anterior temporal lobe resection/encephalomalacia and small focus of right frontal encephalomalacia.      Electronically signed by: Radhames Infante  Date:    02/12/2021  Time:    13:39   Results for orders placed or performed during the hospital encounter of 08/25/18   MRI Brain W WO Contrast    Narrative     EXAMINATION:  MRI BRAIN W WO CONTRAST    CLINICAL HISTORY:  UNK; Migraine, unspecified, not intractable, without status migrainosus, history of epileptic seizures with history of surgery site not specified    TECHNIQUE:  Multiplanar MR imaging of the brain was performed both before and after IV administration of 15 cc gadolinium    COMPARISON:  None.    FINDINGS:  No acute infarct, mass effect or hemorrhage.  Encephalomalacia is in the right anterior temporal lobe possibly relating to the patient's history of surgery.  Small focus of encephalomalacia is also in the inferolateral right frontal lobe, which may also be postprocedural.  Otherwise, brain parenchyma has a normal signal.  Ventricles are normal.  Meningeal and parenchymal enhancement pattern is normal.    No abnormal extra-axial fluid collections.    Flow voids are maintained in the intracranial arteries and dural venous sinuses.    Skull base and calvarium have a normal signal.      Impression    1. No acute findings in the brain or abnormal enhancement  2. Postoperative changes in the right temporal region with a small focus of encephalomalacia in the inferior right frontal lobe which may also be postprocedural      Electronically signed by: Juliocesar Covington MD  Date:    08/25/2018  Time:    12:07         Results for orders placed or performed during the hospital encounter of 03/27/23   MRI Brain Without Contrast    Narrative    EXAMINATION:  MRI BRAIN WITHOUT CONTRAST    CLINICAL HISTORY:  Stroke, follow up; Stroke r/o. Extremity Weakness (Pt states left arm and leg weakness with tingling to left arm and face that started 2 hrs ago, no facial droop noted, slight left arm drift. ) Hx of  right sided craniotomy in 2010 for removal of scan tissue. Denies hx of cancer, previous stoke, or Ms.    TECHNIQUE:  Multiplanar multisequence MR imaging of the brain was performed without contrast.    COMPARISON:  CT head without contrast, 03/27/2023, 08/23/2021.  MRI  brain with without contrast, 02/24/2023.    FINDINGS:  INTRACRANIAL: Postsurgical changes of prior right pterional craniotomy with underlying resection/encephalomalacia and T2 FLAIR hyperintense signal along the margins of the cavity, similar to prior study small focus of right frontal encephalomalacia is also unchanged.  Otherwise normal parenchymal signal, unchanged from prior study.  No parenchymal restricted diffusion.  No evidence of acute intracranial hemorrhage.  Mild hemosiderin staining associated with the right frontal encephalomalacia.  No extra-axial fluid collection or mass.  No midline shift or effacement of basal cisterns.  No hydrocephalus.  Midline structures have a normal configuration.  Visualized pituitary gland and infundibulum are normal.  Visualized major intracranial vascular structures demonstrate normal flow voids and are normal in course and caliber.    SINUSES: Bilateral ethmoidectomies, maxillary antrostomies and sphenoidotomies.  Moderate right maxillary sinus mucosal thickening with trace right maxillary fluid.  Mild-moderate right frontal, ethmoid and sphenoid sinus mucosal thickening.  Trace left maxillary sinus mucosal thickening.  Mastoid air cells are clear.    ORBITS: Visualized orbits are normal.      Impression    1. No acute intracranial abnormality.  2. Prior right pterional craniotomy with underlying postsurgical changes, stable from prior study.      Electronically signed by: Radhames Infante MD  Date:    03/27/2023  Time:    18:18   CT Head Without Contrast    Narrative    EXAMINATION:  CT HEAD WITHOUT CONTRAST    CLINICAL HISTORY:  STROKE ALERT (Pt states left arm and leg weakness with tingling to left arm and face that started 2 hrs ago, no facial droop noted, slight left arm drift.). Neuro deficit, acute, stroke suspected    TECHNIQUE:  Low dose axial images were obtained through the head.  Coronal and sagittal reformations were also performed. Contrast was not  administered.  Dose reduction techniques including automatic exposure control (AEC) were utilized.    Dose (DLP): 595 mGycm    COMPARISON:  CT head without contrast, 08/23/2021.  MRI brain with without contrast, 02/24/2023.    FINDINGS:  INTRACRANIAL: Prior right pterional craniotomy is again demonstrated with underlying right temporal lobe resection/encephalomalacia, similar to prior study.  Small focus of encephalomalacia in the right frontal lobe is unchanged.  Normal global parenchymal volume.  No new loss of gray-white differentiation..  No acute intracranial hemorrhage.  No hydrocephalus.  No intracranial mass effect.    SINUSES: Moderate right maxillary sinus mucosal thickening with questionable trace maxillary fluid.  Mild-moderate right ethmoid and sphenoid sinus mucosal thickening.  Mild right frontal sinus mucosal thickening.  Bilateral ethmoidectomies, maxillary antrostomies and bilateral sphenoidotomies.  Mastoid air cells are clear.    SKULL/SCALP: Right-sided pterional all craniotomy is unchanged.  Incidental persistent metopic suture.    ORBITS: Visualized orbits are normal.      Impression    1. No acute intracranial abnormality.  2. Prior right pterional craniotomy with unchanged underlying right anterior temporal resection/encephalomalacia and small focus of right frontal encephalomalacia.    Findings discussed with Dr. Robles at 17:13 on 03/27/2023.      Electronically signed by: Radhames Infante MD  Date:    03/27/2023  Time:    17:15     *Note: Due to a large number of results and/or encounters for the requested time period, some results have not been displayed. A complete set of results can be found in Results Review.       Assessment and Plan   Chronic migraine without aura, with intractable migraine, so stated, with status migrainosus. This patient has long-life history of migraines in the context of multiple co-morbidities. No response to multiple AEDs, Antidepressants.   BOTOX  The  patient has chronic migraines ( G43.719) and suffers from headaches more than 3 months, more than 15 days of headache days per month lasting more than 4 hours with at least 8 attacks that meet criteria for migraine. She has tried multiple medications including but not limited to Verapamil, Aimovig, Ajovy, Emgality Nurtec, Depakote, Topamax, Elavil, Prozac, and may others, see above. The patient is an ideal candidate for Botox. After treatment, I expect 50%  improvement in the patient's symptoms. A reduction of at least 7 days per month and the number of cumulative hours suffering with headaches as well as at least 100 total hours affected with migraine per month. After obtaining informed consent and under aseptic technique, a total of 155 units of botulinum toxin type A will be injected in the following muscles: Procerus 5 units,  5 units bilaterally, frontalis 20 units, temporalis 20 units bilaterally, occipitalis 15 units, upper cervical paraspinals 10 units bilaterally. I will spare the trapezii as she suffers with neck pain when injected in that area. In clinical trials, neck pain was the most common side effect affecting 9% of the patients. Frequency of treatment is every 3 months unless no response to the treatments, at which time we will discontinue the injections.       Continue Botox  Continue Topiramate   D/C Nerivio  Continue Verapamil and Norvasc  Stop Ajovy  ASA 81mg for stroke prevention given complex migraines  ADD IMITREX 6 MG SC PRN FOR SEVERE ATTACKS  CONSIDER NAMENDA    Bettinaow was considered for rescue Not covered by insurance and it does not seem to work. -     Multiple co-morbidities as reflected in the reviewed problem list    I have discussed the side effects of the medications prescribed and the patient acknowledges understanding    Rtc FOR REPEAT bOTOX        Alfa Dailey M.D  Medical Director, Headache and Facial Pain  [facility]

## 2025-08-23 ENCOUNTER — APPOINTMENT (OUTPATIENT)
Dept: GENERAL RADIOLOGY | Age: 40
End: 2025-08-23
Payer: MEDICAID

## 2025-08-23 ENCOUNTER — HOSPITAL ENCOUNTER (EMERGENCY)
Age: 40
Discharge: HOME | End: 2025-08-24
Payer: MEDICAID

## 2025-08-23 DIAGNOSIS — R40.4 TRANSIENT ALTERATION OF AWARENESS: Primary | ICD-10-CM

## 2025-08-23 DIAGNOSIS — Z91.89 AT RISK FOR MEDICATION NONCOMPLIANCE: ICD-10-CM

## 2025-08-23 DIAGNOSIS — Z76.89 ENCOUNTER FOR PSYCHIATRIC ASSESSMENT: ICD-10-CM

## 2025-08-23 DIAGNOSIS — R53.83 FATIGUE, UNSPECIFIED TYPE: ICD-10-CM

## 2025-08-23 LAB
ALBUMIN SERPL-MCNC: 4.1 G/DL (ref 3.5–5.2)
ALP SERPL-CCNC: 55 U/L (ref 35–104)
ALT SERPL-CCNC: 13 U/L (ref 0–35)
AMPHET UR QL SCN: NEGATIVE
ANION GAP SERPL CALCULATED.3IONS-SCNC: 11 MMOL/L (ref 7–16)
APAP SERPL-MCNC: <5 UG/ML (ref 10–30)
AST SERPL-CCNC: 17 U/L (ref 0–35)
BARBITURATES UR QL SCN: NEGATIVE
BASOPHILS # BLD: 0.05 K/UL (ref 0–0.2)
BASOPHILS NFR BLD: 1 % (ref 0–2)
BENZODIAZ UR QL: NEGATIVE
BILIRUB SERPL-MCNC: 0.3 MG/DL (ref 0–1.2)
BILIRUB UR QL STRIP: NEGATIVE
BUN SERPL-MCNC: 12 MG/DL (ref 6–20)
BUPRENORPHINE UR QL: NEGATIVE
CALCIUM SERPL-MCNC: 9.3 MG/DL (ref 8.6–10)
CANNABINOIDS UR QL SCN: POSITIVE
CHLORIDE SERPL-SCNC: 106 MMOL/L (ref 98–107)
CLARITY UR: CLEAR
CO2 SERPL-SCNC: 24 MMOL/L (ref 22–29)
COCAINE UR QL SCN: NEGATIVE
COLOR UR: YELLOW
CREAT SERPL-MCNC: 0.8 MG/DL (ref 0.5–1)
D-DIMER QUANTITATIVE: <200 NG/ML DDU (ref 0–230)
EOSINOPHIL # BLD: 0.11 K/UL (ref 0.05–0.5)
EOSINOPHILS RELATIVE PERCENT: 2 % (ref 0–6)
ERYTHROCYTE [DISTWIDTH] IN BLOOD BY AUTOMATED COUNT: 15.2 % (ref 11.5–15)
ETHANOLAMINE SERPL-MCNC: <10 MG/DL (ref 0–0.08)
FENTANYL UR QL: NEGATIVE
GFR, ESTIMATED: >90 ML/MIN/1.73M2
GLUCOSE SERPL-MCNC: 96 MG/DL (ref 74–99)
GLUCOSE UR STRIP-MCNC: NEGATIVE MG/DL
HCG UR QL: NEGATIVE
HCT VFR BLD AUTO: 34.3 % (ref 34–48)
HGB BLD-MCNC: 10.7 G/DL (ref 11.5–15.5)
HGB UR QL STRIP.AUTO: ABNORMAL
IMM GRANULOCYTES # BLD AUTO: <0.03 K/UL (ref 0–0.58)
IMM GRANULOCYTES NFR BLD: 0 % (ref 0–5)
KETONES UR STRIP-MCNC: NEGATIVE MG/DL
LACTATE BLDV-SCNC: 1.1 MMOL/L (ref 0.5–2.2)
LEUKOCYTE ESTERASE UR QL STRIP: NEGATIVE
LYMPHOCYTES NFR BLD: 2.31 K/UL (ref 1.5–4)
LYMPHOCYTES RELATIVE PERCENT: 47 % (ref 20–42)
MCH RBC QN AUTO: 25.9 PG (ref 26–35)
MCHC RBC AUTO-ENTMCNC: 31.2 G/DL (ref 32–34.5)
MCV RBC AUTO: 83.1 FL (ref 80–99.9)
METHADONE UR QL: NEGATIVE
MONOCYTES NFR BLD: 0.44 K/UL (ref 0.1–0.95)
MONOCYTES NFR BLD: 9 % (ref 2–12)
NEUTROPHILS NFR BLD: 41 % (ref 43–80)
NEUTS SEG NFR BLD: 1.99 K/UL (ref 1.8–7.3)
NITRITE UR QL STRIP: NEGATIVE
OPIATES UR QL SCN: NEGATIVE
OXYCODONE UR QL SCN: NEGATIVE
PCP UR QL SCN: NEGATIVE
PH UR STRIP: 6 [PH] (ref 5–8)
PLATELET # BLD AUTO: 307 K/UL (ref 130–450)
PMV BLD AUTO: 11.3 FL (ref 7–12)
POTASSIUM SERPL-SCNC: 3.8 MMOL/L (ref 3.5–5.1)
PROT SERPL-MCNC: 6.3 G/DL (ref 6.4–8.3)
PROT UR STRIP-MCNC: NEGATIVE MG/DL
RBC # BLD AUTO: 4.13 M/UL (ref 3.5–5.5)
RBC #/AREA URNS HPF: ABNORMAL /HPF
SALICYLATES SERPL-MCNC: <0.5 MG/DL (ref 0–30)
SODIUM SERPL-SCNC: 141 MMOL/L (ref 136–145)
SP GR UR STRIP: <1.005 (ref 1–1.03)
TEST INFORMATION: ABNORMAL
TOXIC TRICYCLIC SC,BLOOD: NEGATIVE
TROPONIN I SERPL HS-MCNC: 6 NG/L (ref 0–14)
UROBILINOGEN UR STRIP-ACNC: 0.2 EU/DL (ref 0–1)
WBC #/AREA URNS HPF: ABNORMAL /HPF
WBC OTHER # BLD: 4.9 K/UL (ref 4.5–11.5)

## 2025-08-23 PROCEDURE — 80179 DRUG ASSAY SALICYLATE: CPT

## 2025-08-23 PROCEDURE — 90792 PSYCH DIAG EVAL W/MED SRVCS: CPT

## 2025-08-23 PROCEDURE — 81001 URINALYSIS AUTO W/SCOPE: CPT

## 2025-08-23 PROCEDURE — 96360 HYDRATION IV INFUSION INIT: CPT

## 2025-08-23 PROCEDURE — 71046 X-RAY EXAM CHEST 2 VIEWS: CPT

## 2025-08-23 PROCEDURE — 80307 DRUG TEST PRSMV CHEM ANLYZR: CPT

## 2025-08-23 PROCEDURE — 2580000003 HC RX 258

## 2025-08-23 PROCEDURE — 85379 FIBRIN DEGRADATION QUANT: CPT

## 2025-08-23 PROCEDURE — 80053 COMPREHEN METABOLIC PANEL: CPT

## 2025-08-23 PROCEDURE — 83605 ASSAY OF LACTIC ACID: CPT

## 2025-08-23 PROCEDURE — G0480 DRUG TEST DEF 1-7 CLASSES: HCPCS

## 2025-08-23 PROCEDURE — 84703 CHORIONIC GONADOTROPIN ASSAY: CPT

## 2025-08-23 PROCEDURE — 96361 HYDRATE IV INFUSION ADD-ON: CPT

## 2025-08-23 PROCEDURE — 93005 ELECTROCARDIOGRAM TRACING: CPT

## 2025-08-23 PROCEDURE — 85025 COMPLETE CBC W/AUTO DIFF WBC: CPT

## 2025-08-23 PROCEDURE — 84484 ASSAY OF TROPONIN QUANT: CPT

## 2025-08-23 PROCEDURE — 99285 EMERGENCY DEPT VISIT HI MDM: CPT

## 2025-08-23 PROCEDURE — 80143 DRUG ASSAY ACETAMINOPHEN: CPT

## 2025-08-23 RX ORDER — 0.9 % SODIUM CHLORIDE 0.9 %
1000 INTRAVENOUS SOLUTION INTRAVENOUS ONCE
Status: COMPLETED | OUTPATIENT
Start: 2025-08-23 | End: 2025-08-24

## 2025-08-23 RX ORDER — 0.9 % SODIUM CHLORIDE 0.9 %
1000 INTRAVENOUS SOLUTION INTRAVENOUS ONCE
Status: COMPLETED | OUTPATIENT
Start: 2025-08-23 | End: 2025-08-23

## 2025-08-23 RX ORDER — ACETAMINOPHEN 325 MG/1
650 TABLET ORAL ONCE
Status: COMPLETED | OUTPATIENT
Start: 2025-08-23 | End: 2025-08-24

## 2025-08-23 RX ADMIN — SODIUM CHLORIDE 1000 ML: 9 INJECTION, SOLUTION INTRAVENOUS at 13:37

## 2025-08-23 ASSESSMENT — LIFESTYLE VARIABLES
HOW MANY STANDARD DRINKS CONTAINING ALCOHOL DO YOU HAVE ON A TYPICAL DAY: 1 OR 2
HOW OFTEN DO YOU HAVE A DRINK CONTAINING ALCOHOL: MONTHLY OR LESS

## 2025-08-24 VITALS
DIASTOLIC BLOOD PRESSURE: 71 MMHG | OXYGEN SATURATION: 100 % | HEART RATE: 83 BPM | TEMPERATURE: 98.1 F | BODY MASS INDEX: 27 KG/M2 | RESPIRATION RATE: 16 BRPM | WEIGHT: 172 LBS | SYSTOLIC BLOOD PRESSURE: 121 MMHG | HEIGHT: 67 IN

## 2025-08-24 PROBLEM — T43.205A: Status: ACTIVE | Noted: 2025-08-24

## 2025-08-24 LAB
EKG ATRIAL RATE: 78 BPM
EKG P AXIS: 57 DEGREES
EKG P-R INTERVAL: 140 MS
EKG Q-T INTERVAL: 376 MS
EKG QRS DURATION: 96 MS
EKG QTC CALCULATION (BAZETT): 428 MS
EKG R AXIS: -50 DEGREES
EKG T AXIS: 41 DEGREES
EKG VENTRICULAR RATE: 78 BPM

## 2025-08-24 PROCEDURE — 6370000000 HC RX 637 (ALT 250 FOR IP)

## 2025-08-24 PROCEDURE — 99213 OFFICE O/P EST LOW 20 MIN: CPT | Performed by: NURSE PRACTITIONER

## 2025-08-24 PROCEDURE — 2580000003 HC RX 258

## 2025-08-24 PROCEDURE — 93010 ELECTROCARDIOGRAM REPORT: CPT | Performed by: INTERNAL MEDICINE

## 2025-08-24 PROCEDURE — 96361 HYDRATE IV INFUSION ADD-ON: CPT

## 2025-08-24 RX ORDER — HYDROXYZINE HYDROCHLORIDE 25 MG/1
25 TABLET, FILM COATED ORAL EVERY 8 HOURS PRN
Qty: 21 TABLET | Refills: 0 | Status: SHIPPED | OUTPATIENT
Start: 2025-08-24 | End: 2025-08-31

## 2025-08-24 RX ORDER — DULOXETIN HYDROCHLORIDE 20 MG/1
20 CAPSULE, DELAYED RELEASE ORAL DAILY
Qty: 7 CAPSULE | Refills: 0 | Status: SHIPPED | OUTPATIENT
Start: 2025-08-24 | End: 2025-08-31

## 2025-08-24 RX ADMIN — SODIUM CHLORIDE 1000 ML: 9 INJECTION, SOLUTION INTRAVENOUS at 00:15

## 2025-08-24 RX ADMIN — ACETAMINOPHEN 650 MG: 325 TABLET ORAL at 00:15
